# Patient Record
Sex: FEMALE | Race: WHITE | NOT HISPANIC OR LATINO | Employment: OTHER | URBAN - METROPOLITAN AREA
[De-identification: names, ages, dates, MRNs, and addresses within clinical notes are randomized per-mention and may not be internally consistent; named-entity substitution may affect disease eponyms.]

---

## 2022-12-30 ENCOUNTER — TRANSFERRED RECORDS (OUTPATIENT)
Dept: HEALTH INFORMATION MANAGEMENT | Facility: CLINIC | Age: 72
End: 2022-12-30

## 2023-01-13 ENCOUNTER — MEDICAL CORRESPONDENCE (OUTPATIENT)
Dept: HEALTH INFORMATION MANAGEMENT | Facility: HOSPITAL | Age: 73
End: 2023-01-13

## 2023-01-13 ENCOUNTER — TELEPHONE (OUTPATIENT)
Dept: INFUSION THERAPY | Facility: OTHER | Age: 73
End: 2023-01-13

## 2023-01-13 ENCOUNTER — TRANSFERRED RECORDS (OUTPATIENT)
Dept: HEALTH INFORMATION MANAGEMENT | Facility: HOSPITAL | Age: 73
End: 2023-01-13

## 2023-01-13 ENCOUNTER — TELEPHONE (OUTPATIENT)
Dept: FAMILY MEDICINE | Facility: OTHER | Age: 73
End: 2023-01-13
Payer: COMMERCIAL

## 2023-01-13 DIAGNOSIS — Z53.9 ERRONEOUS ENCOUNTER--DISREGARD: Primary | ICD-10-CM

## 2023-01-13 DIAGNOSIS — M81.0 SENILE OSTEOPOROSIS: ICD-10-CM

## 2023-01-13 PROCEDURE — 10000001 PR ERRONEOUS ENCOUNTER--DISREGARD: Performed by: FAMILY MEDICINE

## 2023-01-13 NOTE — NURSING NOTE
Received signed faxed orders from Dr Vazquez with [facility] for Reclast 5mg IV x1 over 15 min. Labs ordered prior, but one of them is a vitamin D level, which can take some time (>1 day) to result. No lab results faxed with patient info/orders from [facility]. Call to their office, they did not do any recent labs on patient. Alerted will reach out to patient to figure out where she wants them done ahead of time. Did also alert Cherelle with Dr Vazquez that we could order the follow up labs to be done in our clinic lab, if patient chooses, but the follow up on the results would be solely the responsibility of the provider and patient, as once her infusion is done, we would not be following any labs. Cherelle verbalizes understanding.     Call to hospital lab, spoke with Cassie, who noted would need at least 2 days after draw to ensure Vitamin D resulted.     Call to patient and asked where she would like her labs done. She prefers to have them done here at our clinic. Explained that once the orders are processed, our  will call her with a clinic lab appointment and then 3 to 7 days later, an infusion appt. Verbalizes understanding.     Therapy plan placed. Made sure to spell out the need for lab appts before and after infusion appt, in appt scheduling request.     Orders placed for secondary review.

## 2023-01-13 NOTE — Clinical Note
Patient needs to be scheduled for reclast but she needs to also have two clinic labs scheduled.  One about three day before reclast and one about a week after reclast.  Please reach out to patient.  Level 2

## 2023-01-16 NOTE — PROGRESS NOTES
Secondary review of reclast therapy plan complete. Orders accurate and up to date. [place] reclast therapy plan faxed to Dr Vazquez to sign.

## 2023-01-20 ENCOUNTER — TELEPHONE (OUTPATIENT)
Dept: INFUSION THERAPY | Facility: OTHER | Age: 73
End: 2023-01-20

## 2023-01-20 DIAGNOSIS — M81.0 SENILE OSTEOPOROSIS: Primary | ICD-10-CM

## 2023-01-20 RX ORDER — HEPARIN SODIUM (PORCINE) LOCK FLUSH IV SOLN 100 UNIT/ML 100 UNIT/ML
5 SOLUTION INTRAVENOUS
Status: CANCELLED | OUTPATIENT
Start: 2023-01-20

## 2023-01-20 RX ORDER — HEPARIN SODIUM,PORCINE 10 UNIT/ML
5 VIAL (ML) INTRAVENOUS
Status: CANCELLED | OUTPATIENT
Start: 2023-01-20

## 2023-01-20 RX ORDER — ZOLEDRONIC ACID 5 MG/100ML
5 INJECTION, SOLUTION INTRAVENOUS ONCE
Status: CANCELLED
Start: 2023-01-20

## 2023-01-20 RX ORDER — DIPHENHYDRAMINE HYDROCHLORIDE 50 MG/ML
50 INJECTION INTRAMUSCULAR; INTRAVENOUS
Status: CANCELLED
Start: 2023-01-20

## 2023-01-20 RX ORDER — ALBUTEROL SULFATE 90 UG/1
1-2 AEROSOL, METERED RESPIRATORY (INHALATION)
Status: CANCELLED
Start: 2023-01-20

## 2023-01-20 RX ORDER — MEPERIDINE HYDROCHLORIDE 25 MG/ML
25 INJECTION INTRAMUSCULAR; INTRAVENOUS; SUBCUTANEOUS EVERY 30 MIN PRN
Status: CANCELLED | OUTPATIENT
Start: 2023-01-20

## 2023-01-20 RX ORDER — ACETAMINOPHEN 325 MG/1
975 TABLET ORAL ONCE
Status: CANCELLED | OUTPATIENT
Start: 2023-01-20

## 2023-01-20 RX ORDER — EPINEPHRINE 1 MG/ML
0.3 INJECTION, SOLUTION, CONCENTRATE INTRAVENOUS EVERY 5 MIN PRN
Status: CANCELLED | OUTPATIENT
Start: 2023-01-20

## 2023-01-20 RX ORDER — ALBUTEROL SULFATE 0.83 MG/ML
2.5 SOLUTION RESPIRATORY (INHALATION)
Status: CANCELLED | OUTPATIENT
Start: 2023-01-20

## 2023-01-20 RX ORDER — METHYLPREDNISOLONE SODIUM SUCCINATE 125 MG/2ML
125 INJECTION, POWDER, LYOPHILIZED, FOR SOLUTION INTRAMUSCULAR; INTRAVENOUS
Status: CANCELLED
Start: 2023-01-20

## 2023-01-20 NOTE — NURSING NOTE
Singed orders received from Dr. Vazquez confirming the okay to use our medical emergency set.  Plan sent to Dr. Laws for signature as patient does not use [employer] system.

## 2023-01-20 NOTE — PROGRESS NOTES
I got the notes from the last 2 visits the patient had with Dr. Vazquez (05/25/22 and 12/30/22). Is this what you are looking for or do you need more? Please advise. Thank you.

## 2023-02-07 ENCOUNTER — TELEPHONE (OUTPATIENT)
Dept: INFUSION THERAPY | Facility: OTHER | Age: 73
End: 2023-02-07

## 2023-02-07 NOTE — TELEPHONE ENCOUNTER
Per Mariluz in  Infusion call pt to schedule, pt denied needing/wanting to schedule. Not scheduling at this time per patient.

## 2023-06-12 ENCOUNTER — TRANSFERRED RECORDS (OUTPATIENT)
Dept: HEALTH INFORMATION MANAGEMENT | Facility: CLINIC | Age: 73
End: 2023-06-12

## 2023-06-12 LAB
ALT SERPL-CCNC: 9 U/L (ref 18–65)
AST SERPL-CCNC: 16 U/L (ref 10–30)
CREATININE (EXTERNAL): 0.74 MG/DL (ref 0.7–1.2)
GFR ESTIMATED (EXTERNAL): 85 ML/MIN/1.73M2
GLUCOSE (EXTERNAL): 142 MG/DL (ref 60–99)
HEP C HIM: NORMAL
POTASSIUM (EXTERNAL): 4.4 MMOL/L (ref 3.5–5.1)

## 2023-06-16 ENCOUNTER — TELEPHONE (OUTPATIENT)
Dept: INFUSION THERAPY | Facility: OTHER | Age: 73
End: 2023-06-16

## 2023-06-16 DIAGNOSIS — M06.9 CHRONIC RHEUMATIC ARTHRITIS (H): ICD-10-CM

## 2023-06-16 NOTE — Clinical Note
Sai Alvarado! Ok to get patient on the schedule for a level 3, labs, and simponi with monitoring period. Thanks you.

## 2023-06-16 NOTE — PROGRESS NOTES
Signed orders received from Dr Vazquez for patient to receive simponi aria 150 mg every 8 weeks. With labs prior to first infusion and every 2nd infusion.  simponi aria therapy plan placed. Labs placed in open orders.      Call placed to [facility] regarding labs needed prior to treatment Hep B and TB, per [facility] if patient has had these labs recently with fax to chemo/infusion.

## 2023-06-16 NOTE — NURSING NOTE
Secondary review of orders complete. Labs received from [facility], dated 6-12-23, noting negative hep b and tb tests, as well as other labs required prior to infusion. WBC are outside of parameters however. Call out to Dr Vazquez's office asking if we are to proceed with infusion/these labs despite elevated WBC or if we are to redraw patient on date of infusion.

## 2023-06-19 ENCOUNTER — TRANSFERRED RECORDS (OUTPATIENT)
Dept: HEALTH INFORMATION MANAGEMENT | Facility: HOSPITAL | Age: 73
End: 2023-06-19

## 2023-06-19 NOTE — PROGRESS NOTES
TC from [facility] Dr. Vazquez nurse per Dr. Taylor sheriff to proceed with infusion with elevated WBC.  New labs do not have to be drawn for this infusion.

## 2023-06-22 NOTE — PROGRESS NOTES
Received orders back signed from Dr Vazquez, no changes noted. Plan sent to Dr Laws, requesting co-sign. Faxed her the H/P we received on patient from [facility].

## 2023-06-23 DIAGNOSIS — M05.9 RHEUMATOID ARTHRITIS WITH POSITIVE RHEUMATOID FACTOR, INVOLVING UNSPECIFIED SITE (H): Primary | ICD-10-CM

## 2023-06-23 RX ORDER — HEPARIN SODIUM (PORCINE) LOCK FLUSH IV SOLN 100 UNIT/ML 100 UNIT/ML
5 SOLUTION INTRAVENOUS
Status: CANCELLED | OUTPATIENT
Start: 2023-06-23

## 2023-06-23 RX ORDER — EPINEPHRINE 1 MG/ML
0.3 INJECTION, SOLUTION INTRAMUSCULAR; SUBCUTANEOUS EVERY 5 MIN PRN
Status: CANCELLED | OUTPATIENT
Start: 2023-06-23

## 2023-06-23 RX ORDER — METHYLPREDNISOLONE SODIUM SUCCINATE 125 MG/2ML
125 INJECTION, POWDER, LYOPHILIZED, FOR SOLUTION INTRAMUSCULAR; INTRAVENOUS
Status: CANCELLED
Start: 2023-06-23

## 2023-06-23 RX ORDER — DIPHENHYDRAMINE HYDROCHLORIDE 50 MG/ML
50 INJECTION INTRAMUSCULAR; INTRAVENOUS
Status: CANCELLED
Start: 2023-06-23

## 2023-06-23 RX ORDER — HEPARIN SODIUM,PORCINE 10 UNIT/ML
5-20 VIAL (ML) INTRAVENOUS DAILY PRN
Status: CANCELLED | OUTPATIENT
Start: 2023-06-23

## 2023-06-23 RX ORDER — ALBUTEROL SULFATE 90 UG/1
1-2 AEROSOL, METERED RESPIRATORY (INHALATION)
Status: CANCELLED
Start: 2023-06-23

## 2023-06-23 RX ORDER — MEPERIDINE HYDROCHLORIDE 25 MG/ML
25 INJECTION INTRAMUSCULAR; INTRAVENOUS; SUBCUTANEOUS EVERY 30 MIN PRN
Status: CANCELLED | OUTPATIENT
Start: 2023-06-23

## 2023-06-23 RX ORDER — ALBUTEROL SULFATE 0.83 MG/ML
2.5 SOLUTION RESPIRATORY (INHALATION)
Status: CANCELLED | OUTPATIENT
Start: 2023-06-23

## 2023-07-10 ENCOUNTER — INFUSION THERAPY VISIT (OUTPATIENT)
Dept: INFUSION THERAPY | Facility: OTHER | Age: 73
End: 2023-07-10
Payer: MEDICARE

## 2023-07-10 VITALS
SYSTOLIC BLOOD PRESSURE: 118 MMHG | DIASTOLIC BLOOD PRESSURE: 64 MMHG | WEIGHT: 142.2 LBS | OXYGEN SATURATION: 98 % | TEMPERATURE: 97.6 F | RESPIRATION RATE: 18 BRPM | HEART RATE: 93 BPM

## 2023-07-10 DIAGNOSIS — M05.9 RHEUMATOID ARTHRITIS WITH POSITIVE RHEUMATOID FACTOR, INVOLVING UNSPECIFIED SITE (H): Primary | ICD-10-CM

## 2023-07-10 PROCEDURE — 250N000011 HC RX IP 250 OP 636: Mod: JZ | Performed by: FAMILY MEDICINE

## 2023-07-10 PROCEDURE — 96365 THER/PROPH/DIAG IV INF INIT: CPT

## 2023-07-10 RX ORDER — HEPARIN SODIUM (PORCINE) LOCK FLUSH IV SOLN 100 UNIT/ML 100 UNIT/ML
5 SOLUTION INTRAVENOUS
Status: CANCELLED | OUTPATIENT
Start: 2023-09-04

## 2023-07-10 RX ORDER — MEPERIDINE HYDROCHLORIDE 25 MG/ML
25 INJECTION INTRAMUSCULAR; INTRAVENOUS; SUBCUTANEOUS EVERY 30 MIN PRN
Status: CANCELLED | OUTPATIENT
Start: 2023-09-04

## 2023-07-10 RX ORDER — DIPHENHYDRAMINE HYDROCHLORIDE 50 MG/ML
50 INJECTION INTRAMUSCULAR; INTRAVENOUS
Status: CANCELLED
Start: 2023-09-04

## 2023-07-10 RX ORDER — METHYLPREDNISOLONE SODIUM SUCCINATE 125 MG/2ML
125 INJECTION, POWDER, LYOPHILIZED, FOR SOLUTION INTRAMUSCULAR; INTRAVENOUS
Status: CANCELLED
Start: 2023-09-04

## 2023-07-10 RX ORDER — ALBUTEROL SULFATE 90 UG/1
1-2 AEROSOL, METERED RESPIRATORY (INHALATION)
Status: CANCELLED
Start: 2023-09-04

## 2023-07-10 RX ORDER — EPINEPHRINE 1 MG/ML
0.3 INJECTION, SOLUTION, CONCENTRATE INTRAVENOUS EVERY 5 MIN PRN
Status: CANCELLED | OUTPATIENT
Start: 2023-09-04

## 2023-07-10 RX ORDER — HEPARIN SODIUM,PORCINE 10 UNIT/ML
5-20 VIAL (ML) INTRAVENOUS DAILY PRN
Status: CANCELLED | OUTPATIENT
Start: 2023-09-04

## 2023-07-10 RX ORDER — ALBUTEROL SULFATE 0.83 MG/ML
2.5 SOLUTION RESPIRATORY (INHALATION)
Status: CANCELLED | OUTPATIENT
Start: 2023-09-04

## 2023-07-10 RX ADMIN — Medication 250 ML: at 13:33

## 2023-07-10 RX ADMIN — GOLIMUMAB 129 MG: 50 SOLUTION INTRAVENOUS at 13:33

## 2023-07-10 NOTE — PROGRESS NOTES
Post Infusion Assessment:  Patient tolerated infusion including 30 minute monitoring period without incident.   *If a hypersensitivity, medication error, or an adverse event occurred, pharmacy was notified in addition to the provider for FDA Reporting.        Discharge Plan:   Copy of AVS reviewed with patient and/or family.

## 2023-07-10 NOTE — PATIENT INSTRUCTIONS

## 2023-07-10 NOTE — PROGRESS NOTES
Infusion Nursing Note:  Angella Ley presents today for infusion of simponi aria.    Patient seen by provider today: No   present during visit today: Not Applicable.    Intravenous Access:Peripheral IV placed.    Treatment Conditions:  Results reviewed, labs MET treatment parameters, ok to proceed with treatment.

## 2023-07-10 NOTE — NURSING NOTE
Due to discrepancy of orders from ordering provider patient received new orders from Dr. Vazquez today 7/10/2023.  Provider note stated 2 mg/kg but order sheet stated a standard dose of 150mg.  Pharmacy reached out to ordering provider and clarification received. New orders placed.

## 2023-07-20 NOTE — PROGRESS NOTES
Pharmacy questioned dosing of drug, conflicting orders of 150 mg dose every 8 weeks, but instructions also say dose ordered of 2 mg/kg.  [facility]- reached out to Dr. Vazquez for corrected orders and patient is to receive 2mg/kg which equals 129 mg. Verbal okay from co signing provider to adjust the dose Pharmacy will change order.  Patient updated

## 2024-03-01 ENCOUNTER — INFUSION THERAPY VISIT (OUTPATIENT)
Dept: INFUSION THERAPY | Facility: OTHER | Age: 74
End: 2024-03-01
Attending: FAMILY MEDICINE
Payer: MEDICARE

## 2024-03-01 VITALS
DIASTOLIC BLOOD PRESSURE: 67 MMHG | RESPIRATION RATE: 16 BRPM | HEART RATE: 105 BPM | WEIGHT: 142.2 LBS | OXYGEN SATURATION: 94 % | SYSTOLIC BLOOD PRESSURE: 135 MMHG | TEMPERATURE: 98.9 F

## 2024-03-01 DIAGNOSIS — M06.9 CHRONIC RHEUMATIC ARTHRITIS (H): ICD-10-CM

## 2024-03-01 DIAGNOSIS — M05.9 RHEUMATOID ARTHRITIS WITH POSITIVE RHEUMATOID FACTOR, INVOLVING UNSPECIFIED SITE (H): Primary | ICD-10-CM

## 2024-03-01 LAB
ALBUMIN SERPL BCG-MCNC: 3.3 G/DL (ref 3.5–5.2)
ALP SERPL-CCNC: 101 U/L (ref 40–150)
ALT SERPL W P-5'-P-CCNC: 10 U/L (ref 0–50)
ANION GAP SERPL CALCULATED.3IONS-SCNC: 11 MMOL/L (ref 7–15)
AST SERPL W P-5'-P-CCNC: 19 U/L (ref 0–45)
BASOPHILS # BLD AUTO: 0 10E3/UL (ref 0–0.2)
BASOPHILS NFR BLD AUTO: 0 %
BILIRUB SERPL-MCNC: 0.3 MG/DL
BUN SERPL-MCNC: 16.9 MG/DL (ref 8–23)
CALCIUM SERPL-MCNC: 8.9 MG/DL (ref 8.8–10.2)
CHLORIDE SERPL-SCNC: 106 MMOL/L (ref 98–107)
CREAT SERPL-MCNC: 0.78 MG/DL (ref 0.51–0.95)
CRP SERPL-MCNC: 26.22 MG/L
DEPRECATED HCO3 PLAS-SCNC: 24 MMOL/L (ref 22–29)
EGFRCR SERPLBLD CKD-EPI 2021: 79 ML/MIN/1.73M2
EOSINOPHIL # BLD AUTO: 0.3 10E3/UL (ref 0–0.7)
EOSINOPHIL NFR BLD AUTO: 2 %
ERYTHROCYTE [DISTWIDTH] IN BLOOD BY AUTOMATED COUNT: 14.3 % (ref 10–15)
ERYTHROCYTE [SEDIMENTATION RATE] IN BLOOD BY WESTERGREN METHOD: 19 MM/HR (ref 0–30)
GLUCOSE SERPL-MCNC: 111 MG/DL (ref 70–99)
HCT VFR BLD AUTO: 45.9 % (ref 35–47)
HGB BLD-MCNC: 14.4 G/DL (ref 11.7–15.7)
IMM GRANULOCYTES # BLD: 0 10E3/UL
IMM GRANULOCYTES NFR BLD: 0 %
LYMPHOCYTES # BLD AUTO: 0.9 10E3/UL (ref 0–5.3)
LYMPHOCYTES NFR BLD AUTO: 8 %
MCH RBC QN AUTO: 27.3 PG (ref 26.5–33)
MCHC RBC AUTO-ENTMCNC: 31.4 G/DL (ref 31.5–36.5)
MCV RBC AUTO: 87 FL (ref 78–100)
MONOCYTES # BLD AUTO: 0.2 10E3/UL (ref 0–1.3)
MONOCYTES NFR BLD AUTO: 2 %
NEUTROPHILS # BLD AUTO: 10 10E3/UL (ref 1.6–8.3)
NEUTROPHILS NFR BLD AUTO: 88 %
NRBC # BLD AUTO: 0 10E3/UL
NRBC BLD AUTO-RTO: 0 /100
PLATELET # BLD AUTO: 330 10E3/UL (ref 150–450)
POTASSIUM SERPL-SCNC: 4.8 MMOL/L (ref 3.4–5.3)
PROT SERPL-MCNC: 6.4 G/DL (ref 6.4–8.3)
RBC # BLD AUTO: 5.28 10E6/UL (ref 3.8–5.2)
SODIUM SERPL-SCNC: 141 MMOL/L (ref 135–145)
WBC # BLD AUTO: 11.5 10E3/UL (ref 4–11)

## 2024-03-01 PROCEDURE — 80053 COMPREHEN METABOLIC PANEL: CPT | Mod: ZL

## 2024-03-01 PROCEDURE — 250N000011 HC RX IP 250 OP 636: Mod: JW | Performed by: FAMILY MEDICINE

## 2024-03-01 PROCEDURE — 36415 COLL VENOUS BLD VENIPUNCTURE: CPT | Mod: ZL

## 2024-03-01 PROCEDURE — 85652 RBC SED RATE AUTOMATED: CPT | Mod: ZL

## 2024-03-01 PROCEDURE — 85025 COMPLETE CBC W/AUTO DIFF WBC: CPT | Mod: ZL

## 2024-03-01 PROCEDURE — 96365 THER/PROPH/DIAG IV INF INIT: CPT

## 2024-03-01 PROCEDURE — 86140 C-REACTIVE PROTEIN: CPT | Mod: ZL

## 2024-03-01 RX ORDER — NAPROXEN SODIUM 220 MG
220 TABLET ORAL
COMMUNITY
Start: 2023-03-12

## 2024-03-01 RX ORDER — PREDNISONE 2.5 MG/1
3 TABLET ORAL DAILY
COMMUNITY
Start: 2023-11-13

## 2024-03-01 RX ORDER — HEPARIN SODIUM,PORCINE 10 UNIT/ML
5-20 VIAL (ML) INTRAVENOUS DAILY PRN
Status: CANCELLED | OUTPATIENT
Start: 2024-03-18

## 2024-03-01 RX ORDER — MEPERIDINE HYDROCHLORIDE 25 MG/ML
25 INJECTION INTRAMUSCULAR; INTRAVENOUS; SUBCUTANEOUS EVERY 30 MIN PRN
Status: CANCELLED | OUTPATIENT
Start: 2024-03-18

## 2024-03-01 RX ORDER — METHYLPREDNISOLONE SODIUM SUCCINATE 125 MG/2ML
125 INJECTION, POWDER, LYOPHILIZED, FOR SOLUTION INTRAMUSCULAR; INTRAVENOUS
Status: CANCELLED
Start: 2024-03-18

## 2024-03-01 RX ORDER — EPINEPHRINE 1 MG/ML
0.3 INJECTION, SOLUTION, CONCENTRATE INTRAVENOUS EVERY 5 MIN PRN
Status: CANCELLED | OUTPATIENT
Start: 2024-03-18

## 2024-03-01 RX ORDER — HEPARIN SODIUM (PORCINE) LOCK FLUSH IV SOLN 100 UNIT/ML 100 UNIT/ML
5 SOLUTION INTRAVENOUS
Status: CANCELLED | OUTPATIENT
Start: 2024-03-18

## 2024-03-01 RX ORDER — DIPHENHYDRAMINE HYDROCHLORIDE 50 MG/ML
50 INJECTION INTRAMUSCULAR; INTRAVENOUS
Status: CANCELLED
Start: 2024-03-18

## 2024-03-01 RX ORDER — ALBUTEROL SULFATE 0.83 MG/ML
2.5 SOLUTION RESPIRATORY (INHALATION)
Status: CANCELLED | OUTPATIENT
Start: 2024-03-18

## 2024-03-01 RX ORDER — ALBUTEROL SULFATE 90 UG/1
1-2 AEROSOL, METERED RESPIRATORY (INHALATION)
Status: CANCELLED
Start: 2024-03-18

## 2024-03-01 RX ADMIN — Medication 250 ML: at 12:47

## 2024-03-01 RX ADMIN — GOLIMUMAB 129 MG: 50 SOLUTION INTRAVENOUS at 12:47

## 2024-03-01 NOTE — PATIENT INSTRUCTIONS

## 2024-03-01 NOTE — PROGRESS NOTES
Infusion Nursing Note:  Angella Ley presents today for Mathew Diaz.    Patient seen by provider today: No   present during visit today: Not Applicable.    Note: Patient notes she did really well after her last infusion, notes no s/e etc.      Treatment Conditions:  Lab Results   Component Value Date    HGB 14.4 03/01/2024    WBC 11.5 (H) 03/01/2024    ANEUTAUTO 10.0 (H) 03/01/2024     03/01/2024        Lab Results   Component Value Date     03/01/2024    POTASSIUM 4.8 03/01/2024    CR 0.78 03/01/2024    ALONA 8.9 03/01/2024    BILITOTAL 0.3 03/01/2024    ALBUMIN 3.3 (L) 03/01/2024    ALT 10 03/01/2024    AST 19 03/01/2024       Results reviewed, labs MET treatment parameters, ok to proceed with treatment.      Post Infusion Assessment:  Patient tolerated infusion without incident.    Delegated discharge to Lorraine OLIVER.      Faxed labs to [facility].

## 2024-03-01 NOTE — PROGRESS NOTES
Discussions with pharmacy yesterday per infusion RN Bonnie re resumption of drug after length of time without and questions re original orders and updated orders for dosing. Call to Ariadna in pharmacy this am, she notes they confirmed through [facility] charting review that patients MD did want her to resume infusions at this time and therapy plan order does now appear to show 2mg/kg as most recently ordered.

## 2024-04-26 ENCOUNTER — INFUSION THERAPY VISIT (OUTPATIENT)
Dept: INFUSION THERAPY | Facility: OTHER | Age: 74
End: 2024-04-26
Attending: FAMILY MEDICINE
Payer: MEDICARE

## 2024-04-26 VITALS
HEART RATE: 66 BPM | HEIGHT: 64 IN | DIASTOLIC BLOOD PRESSURE: 68 MMHG | WEIGHT: 145.2 LBS | BODY MASS INDEX: 24.79 KG/M2 | RESPIRATION RATE: 17 BRPM | SYSTOLIC BLOOD PRESSURE: 105 MMHG

## 2024-04-26 DIAGNOSIS — M05.9 RHEUMATOID ARTHRITIS WITH POSITIVE RHEUMATOID FACTOR, INVOLVING UNSPECIFIED SITE (H): Primary | ICD-10-CM

## 2024-04-26 PROCEDURE — 96365 THER/PROPH/DIAG IV INF INIT: CPT

## 2024-04-26 PROCEDURE — 258N000003 HC RX IP 258 OP 636: Performed by: FAMILY MEDICINE

## 2024-04-26 PROCEDURE — 250N000011 HC RX IP 250 OP 636: Mod: JW | Performed by: FAMILY MEDICINE

## 2024-04-26 RX ORDER — HEPARIN SODIUM (PORCINE) LOCK FLUSH IV SOLN 100 UNIT/ML 100 UNIT/ML
5 SOLUTION INTRAVENOUS
OUTPATIENT
Start: 2024-06-10

## 2024-04-26 RX ORDER — DIPHENHYDRAMINE HYDROCHLORIDE 50 MG/ML
50 INJECTION INTRAMUSCULAR; INTRAVENOUS
Start: 2024-06-10

## 2024-04-26 RX ORDER — ALBUTEROL SULFATE 90 UG/1
1-2 AEROSOL, METERED RESPIRATORY (INHALATION)
Start: 2024-06-10

## 2024-04-26 RX ORDER — ALBUTEROL SULFATE 0.83 MG/ML
2.5 SOLUTION RESPIRATORY (INHALATION)
OUTPATIENT
Start: 2024-06-10

## 2024-04-26 RX ORDER — METHYLPREDNISOLONE SODIUM SUCCINATE 125 MG/2ML
125 INJECTION, POWDER, LYOPHILIZED, FOR SOLUTION INTRAMUSCULAR; INTRAVENOUS
Start: 2024-06-10

## 2024-04-26 RX ORDER — EPINEPHRINE 1 MG/ML
0.3 INJECTION, SOLUTION, CONCENTRATE INTRAVENOUS EVERY 5 MIN PRN
OUTPATIENT
Start: 2024-06-10

## 2024-04-26 RX ORDER — MEPERIDINE HYDROCHLORIDE 25 MG/ML
25 INJECTION INTRAMUSCULAR; INTRAVENOUS; SUBCUTANEOUS EVERY 30 MIN PRN
OUTPATIENT
Start: 2024-06-10

## 2024-04-26 RX ORDER — HEPARIN SODIUM,PORCINE 10 UNIT/ML
5-20 VIAL (ML) INTRAVENOUS DAILY PRN
OUTPATIENT
Start: 2024-06-10

## 2024-04-26 RX ADMIN — SODIUM CHLORIDE 250 ML: 9 INJECTION, SOLUTION INTRAVENOUS at 11:21

## 2024-04-26 RX ADMIN — GOLIMUMAB 132 MG: 50 SOLUTION INTRAVENOUS at 11:22

## 2024-04-26 ASSESSMENT — PAIN SCALES - GENERAL: PAINLEVEL: NO PAIN (1)

## 2024-04-26 NOTE — PROGRESS NOTES
Infusion Nursing Note:  Angella Ley presents today for janicecici josé luiszackary.    Patient seen by provider today: No   present during visit today: Not Applicable.    Note: Patient offers no questions or concerns.  She is accompayined by her niece Kayla.  Patient is a moderate historian with her niece filling in some of the blanks on her assessment.  Patient is pleasant and thanful for today's services.  Patient orders have  with our center and new orders have been requested from her provider.  Patient and niece are concerned about scheduling we are unable to schedule out until new orders are received.  This writer assured patient and niece that we will reach out next week to schedule once we have processed the orders.  .      Intravenous Access:  Peripheral IV placed.    Treatment Conditions:  Lab Results   Component Value Date    HGB 14.4 2024    WBC 11.5 (H) 2024    ANEUTAUTO 10.0 (H) 2024     2024        Lab Results   Component Value Date     2024    POTASSIUM 4.8 2024    CR 0.78 2024    ALONA 8.9 2024    BILITOTAL 0.3 2024    ALBUMIN 3.3 (L) 2024    ALT 10 2024    AST 19 2024       Results reviewed, labs MET treatment parameters, ok to proceed with treatment.      Post Infusion Assessment:  Patient tolerated infusion without incident.  Blood return noted pre and post infusion.  Site patent and intact, free from redness, edema or discomfort.  No evidence of extravasations.  Access discontinued per protocol.       Discharge Plan:   Patient declined prescription refills.  Patient and/or family verbalized understanding of discharge instructions and all questions answered.  AVS to patient via [email or web address].  Patient will return 8 weeks for next appointment.   Patient discharged in stable condition accompanied by: self and Niece.  Departure Mode: Ambulatory.      Bonnie Carranza RN

## 2024-04-26 NOTE — PROGRESS NOTES
New orders requested from  [facility].  They asked that we send her orders as they do not have record of that.  Patient received orders originally from [facility] Dr. Vazquez.  Orders faxed per their request and they will send us updated orders on Monday.  Patient and care giver updated.

## 2024-05-02 DIAGNOSIS — M05.9 RHEUMATOID ARTHRITIS WITH POSITIVE RHEUMATOID FACTOR, INVOLVING UNSPECIFIED SITE (H): Primary | ICD-10-CM

## 2024-05-02 NOTE — PROGRESS NOTES
New orders placed for this patient per the request of Dr. [place] Diaz 2 mg/kg every 8 weeks.  Labs with each infusion.  See labs in open orders.  Placed for secondary review.

## 2024-05-06 DIAGNOSIS — M05.9 RHEUMATOID ARTHRITIS WITH POSITIVE RHEUMATOID FACTOR, INVOLVING UNSPECIFIED SITE (H): Primary | ICD-10-CM

## 2024-05-06 RX ORDER — ALBUTEROL SULFATE 90 UG/1
1-2 AEROSOL, METERED RESPIRATORY (INHALATION)
Status: CANCELLED
Start: 2024-05-09

## 2024-05-06 RX ORDER — HEPARIN SODIUM,PORCINE 10 UNIT/ML
5-20 VIAL (ML) INTRAVENOUS DAILY PRN
Status: CANCELLED | OUTPATIENT
Start: 2024-05-09

## 2024-05-06 RX ORDER — EPINEPHRINE 1 MG/ML
0.3 INJECTION, SOLUTION INTRAMUSCULAR; SUBCUTANEOUS EVERY 5 MIN PRN
Status: CANCELLED | OUTPATIENT
Start: 2024-05-09

## 2024-05-06 RX ORDER — ALBUTEROL SULFATE 0.83 MG/ML
2.5 SOLUTION RESPIRATORY (INHALATION)
Status: CANCELLED | OUTPATIENT
Start: 2024-05-09

## 2024-05-06 RX ORDER — METHYLPREDNISOLONE SODIUM SUCCINATE 125 MG/2ML
125 INJECTION, POWDER, LYOPHILIZED, FOR SOLUTION INTRAMUSCULAR; INTRAVENOUS
Status: CANCELLED
Start: 2024-05-09

## 2024-05-06 RX ORDER — HEPARIN SODIUM (PORCINE) LOCK FLUSH IV SOLN 100 UNIT/ML 100 UNIT/ML
5 SOLUTION INTRAVENOUS
Status: CANCELLED | OUTPATIENT
Start: 2024-05-09

## 2024-05-06 RX ORDER — MEPERIDINE HYDROCHLORIDE 25 MG/ML
25 INJECTION INTRAMUSCULAR; INTRAVENOUS; SUBCUTANEOUS EVERY 30 MIN PRN
Status: CANCELLED | OUTPATIENT
Start: 2024-05-09

## 2024-05-06 RX ORDER — DIPHENHYDRAMINE HYDROCHLORIDE 50 MG/ML
50 INJECTION INTRAMUSCULAR; INTRAVENOUS
Status: CANCELLED
Start: 2024-05-09

## 2024-07-10 ENCOUNTER — INFUSION THERAPY VISIT (OUTPATIENT)
Dept: INFUSION THERAPY | Facility: OTHER | Age: 74
End: 2024-07-10
Attending: FAMILY MEDICINE
Payer: MEDICARE

## 2024-07-10 VITALS
SYSTOLIC BLOOD PRESSURE: 126 MMHG | TEMPERATURE: 97.7 F | HEART RATE: 80 BPM | HEIGHT: 64 IN | DIASTOLIC BLOOD PRESSURE: 75 MMHG | OXYGEN SATURATION: 96 % | BODY MASS INDEX: 24.91 KG/M2 | WEIGHT: 145.9 LBS

## 2024-07-10 DIAGNOSIS — M05.9 RHEUMATOID ARTHRITIS WITH POSITIVE RHEUMATOID FACTOR, INVOLVING UNSPECIFIED SITE (H): Primary | ICD-10-CM

## 2024-07-10 LAB
ALT SERPL W P-5'-P-CCNC: 10 U/L (ref 0–50)
CREAT SERPL-MCNC: 0.71 MG/DL (ref 0.51–0.95)
CRP SERPL-MCNC: 4.29 MG/L
EGFRCR SERPLBLD CKD-EPI 2021: 88 ML/MIN/1.73M2
ERYTHROCYTE [SEDIMENTATION RATE] IN BLOOD BY WESTERGREN METHOD: 9 MM/HR (ref 0–30)
HGB BLD-MCNC: 15.4 G/DL (ref 11.7–15.7)
PLATELET # BLD AUTO: 357 10E3/UL (ref 150–450)
WBC # BLD AUTO: 11.1 10E3/UL (ref 4–11)

## 2024-07-10 PROCEDURE — 86140 C-REACTIVE PROTEIN: CPT | Mod: ZL

## 2024-07-10 PROCEDURE — 96365 THER/PROPH/DIAG IV INF INIT: CPT

## 2024-07-10 PROCEDURE — 85049 AUTOMATED PLATELET COUNT: CPT | Mod: ZL

## 2024-07-10 PROCEDURE — 85018 HEMOGLOBIN: CPT | Mod: ZL

## 2024-07-10 PROCEDURE — 84460 ALANINE AMINO (ALT) (SGPT): CPT | Mod: ZL

## 2024-07-10 PROCEDURE — 85652 RBC SED RATE AUTOMATED: CPT | Mod: ZL

## 2024-07-10 PROCEDURE — 250N000011 HC RX IP 250 OP 636: Mod: JW | Performed by: FAMILY MEDICINE

## 2024-07-10 PROCEDURE — 82565 ASSAY OF CREATININE: CPT | Mod: ZL

## 2024-07-10 PROCEDURE — 85048 AUTOMATED LEUKOCYTE COUNT: CPT | Mod: ZL

## 2024-07-10 PROCEDURE — 36415 COLL VENOUS BLD VENIPUNCTURE: CPT

## 2024-07-10 RX ORDER — EPINEPHRINE 1 MG/ML
0.3 INJECTION, SOLUTION, CONCENTRATE INTRAVENOUS EVERY 5 MIN PRN
Status: CANCELLED | OUTPATIENT
Start: 2024-09-04

## 2024-07-10 RX ORDER — DIPHENHYDRAMINE HYDROCHLORIDE 50 MG/ML
50 INJECTION INTRAMUSCULAR; INTRAVENOUS
Status: CANCELLED
Start: 2024-09-04

## 2024-07-10 RX ORDER — HEPARIN SODIUM (PORCINE) LOCK FLUSH IV SOLN 100 UNIT/ML 100 UNIT/ML
5 SOLUTION INTRAVENOUS
Status: CANCELLED | OUTPATIENT
Start: 2024-09-04

## 2024-07-10 RX ORDER — METHYLPREDNISOLONE SODIUM SUCCINATE 125 MG/2ML
125 INJECTION, POWDER, LYOPHILIZED, FOR SOLUTION INTRAMUSCULAR; INTRAVENOUS
Status: CANCELLED
Start: 2024-09-04

## 2024-07-10 RX ORDER — HEPARIN SODIUM,PORCINE 10 UNIT/ML
5-20 VIAL (ML) INTRAVENOUS DAILY PRN
Status: CANCELLED | OUTPATIENT
Start: 2024-09-04

## 2024-07-10 RX ORDER — ALBUTEROL SULFATE 0.83 MG/ML
2.5 SOLUTION RESPIRATORY (INHALATION)
Status: CANCELLED | OUTPATIENT
Start: 2024-09-04

## 2024-07-10 RX ORDER — MEPERIDINE HYDROCHLORIDE 25 MG/ML
25 INJECTION INTRAMUSCULAR; INTRAVENOUS; SUBCUTANEOUS EVERY 30 MIN PRN
Status: CANCELLED | OUTPATIENT
Start: 2024-09-04

## 2024-07-10 RX ORDER — ALBUTEROL SULFATE 90 UG/1
1-2 AEROSOL, METERED RESPIRATORY (INHALATION)
Status: CANCELLED
Start: 2024-09-04

## 2024-07-10 RX ADMIN — Medication 250 ML: at 14:16

## 2024-07-10 RX ADMIN — GOLIMUMAB 132 MG: 50 SOLUTION INTRAVENOUS at 14:18

## 2024-07-10 ASSESSMENT — PAIN SCALES - GENERAL: PAINLEVEL: MODERATE PAIN (4)

## 2024-07-10 NOTE — PROGRESS NOTES
Infusion Nursing Note:  Angellamarlen Ley presents today for Mathew Diaz.    Patient seen by provider today: No   present during visit today: Not Applicable.    Note: N/A.    Intravenous Access:  Peripheral IV placed.  Labs drawn without difficulty from peripheral IV by Lorraine OLIVER.    Treatment Conditions:  Results reviewed, labs MET treatment parameters, ok to proceed with treatment.    Component      Latest Ref Rng 7/10/2024  12:48 PM   Creatinine      0.51 - 0.95 mg/dL 0.71    GFR Estimate      >60 mL/min/1.73m2 88    Hemoglobin      11.7 - 15.7 g/dL 15.4    WBC      4.0 - 11.0 10e3/uL 11.1 (H)    CRP Inflammation      <5.00 mg/L 4.29    Sed Rate      0 - 30 mm/hr 9    ALT      0 - 50 U/L 10    Platelet Count      150 - 450 10e3/uL 357       Legend:  (H) High    Post Infusion Assessment:  Patient tolerated infusion without incident.  Site patent and intact, free from redness, edema or discomfort.  No evidence of extravasations.  Access discontinued per protocol.     Discharge Plan:   Patient discharged in stable condition accompanied by: family.  Departure Mode: Ambulatory.

## 2024-07-29 NOTE — PROGRESS NOTES
[facility]    RADIATION ONCOLOGY CONSULTATION      Angella Ley  is referred by Dr. Renteria for an oncology consultation.    PRIMARY PHYSICIAN: Kenton Pena     Cancer Staging   No matching staging information was found for the patient.      IMPRESSION:Angella unfortunately has locally invasive Stage IB IDC.  ^/8 LN + ER, ID + and Her2 -. Dermal,lymphatic, perineural and skeletal muscle in the pectoralis all present. Healing up well at this point. About 4 weeks out from surgery. Her grand niece is present. Her niece is POA but unavailable today.    PLAN: She is at very high risk of local and regional/dorcas recurrence.  Would recommend radiation to the chest wall and all regional nodes including the IMC nodes to a dose of 50 Gy in 25 fractions     I had the chance to review the course, rationale, risks, benefits , side effects and complications with Angella and her grand niece. I believe they have a good understanding of the issues and have had their questions asked and answered to their satisfaction. Will set her up for simulation and treatment planning.    Orders Placed This Encounter   Procedures    Physical Therapy  Referral      ______________________________________________________________________  HISTORY OF PRESENT ILLNESS: Angella is a 74-year-old female patient who presents for radiation therapy status post left breast mastectomy due to recent diagnosis of multifocal invasive ductal carcinoma, Duke grade 2.  Tumor extensively invaded the dermis, with focal involvement of the epidermis.  Tumor extends to the deep margin focally involves skeletal muscle.  Extensive perineural invasion identified.  ER/ID positive, HER2 negative.  6 of 8 left axillary lymph nodes positive for metastatic carcinoma.  Her Oncotype recurrence score resulted as 12, therefore it is not felt there is benefit to receive chemotherapy.  Patient presents to discuss radiation therapy, with plan of starting on  Arimidex possibly abemaciclib after completion of RT. History as below     4/3/2024 left breast mammogram with ultrasound: Within the left breast at the 5 to 7 o'clock position about 2 cm from the nipple there is an area of concern with irregular shadowing throughout.  There is axillary adenopathy that measures 3 x 1.7 x 2.5 cm    4/11/2024 left breast core biopsy: Low-grade invasive ductal carcinoma.  Left axilla core biopsy: Metastatic invasive ductal carcinoma.  OH positive, HER2 negative    6/19/2024 left breast mastectomy: Multifocal invasive ductal carcinoma, Duke grade 2, foci measuring 45 mm and 9 mm.  Tumor extensively involves the dermis, with focal involvement of the epidermis.  Tumor extends to the deep margin, focally involves skeletal muscle.  Extensive perineural invasion identified.  Minor component of intermediate grade ductal carcinoma in situ.  Atypical lobular hyperplasia.  ER/OH positive, HER2 negative.  6 of 8 left axillary lymph nodes with macrometastases.  Extranodal extension present pathologically staged pT2 pN2a.  Oncotype recurrence score 12    7/24/2024 medical oncology visit ( [facility]): Oncotype recurrence score of 12, no chemotherapy indicated.  Planning for Arimidex 1 mg daily, may add in abemaciclib after completion of radiation therapy.    Scheduling Conflicts: Requires rides, has grand niece (PCA), daughter (POA) who help her out   Systemic Therapy: AI and receives Golimumab for RA)  Pacemaker: denies   Hx of autoimmune disorders: Rheumatoid arthritis (Golimumab infusions q. 58 days)  Previous Radiation Therapy: Patient unsure      Depression Screening Follow Up        Follow Up Actions Taken  Patient counseled, no additional follow up at this time.               Past Medical History:   Diagnosis Date    Actinic keratosis 03/10/2014    Acute deep vein thrombosis (DVT) of calf muscle vein of right lower extremity (H) 05/30/2024    Last Assessment & Plan:    Formatting of  this note might be different from the original.   Resume eliquis today.      Anemia due to acute blood loss 03/10/2023    Last Assessment & Plan:    Formatting of this note might be different from the original.   - Serial hemoglobins   -Anemia is likely due to GI bleed   -Transfuse for hemoglobin less than 7 or symptoms      Atypical ductal hyperplasia of breast     S/p bx    Breast cancer, stage 2, left (H) 05/30/2024    Last Assessment & Plan:    Formatting of this note might be different from the original.   -POD#! S/P left mastectomy with SLNB   - DAAM to  bulb suction, record output, patient education   -compression wrap   -multimodal pain control   -bowel regimen   -heparin VTE prophylaxis   -resume eliquis today      Chondromalacia of patella 11/16/2006    Chronic rheumatic arthritis (H) 06/16/2023    Deep vein thrombosis (DVT) of left lower extremity (H) 2014 Sept 2014- finished taking coumadin April 2015    Depressive disorder 1999    Derangement of lateral meniscus, unspecified laterality 11/16/2006    Diverticulosis of colon without hemorrhage 06/14/2005    DJD (degenerative joint disease) of knee 03/15/2013    Left/and right - 12/6/2013    Edema 07/22/2003    in both ankles    Fall at home 02/04/2024    Gastrointestinal hemorrhage with melena 03/10/2023    History of basal cell carcinoma     Right cheek    History of dysplastic nevus 03/08/2017    Formatting of this note might be different from the original.   Moderate atypia right upper back 2016      Hypercholesterolemia 12/06/2013    Hyperlipidemia 10/25/2007    Formatting of this note might be different from the original.   IMO Update 10/11      Impaired mobility 02/04/2024    Inflammatory polyarthropathy or polyarthritis (H) 12/12/2003    Invasive ductal carcinoma of breast, female, left (H) 05/28/2024    Long-term use of immunosuppressant medication 05/06/2016    Memory loss 1999    Migraine headache 07/22/2003    Moderate Alzheimer's dementia (H)  02/16/2024    Last Assessment & Plan:    Formatting of this note might be different from the original.   Has family member staying with her at home.      DEYANIRA on CPAP 03/15/2016    Osteopenia 05/02/2022    Osteoporosis 10/31/2016    Pain in joint, lower leg 07/22/2003    left medial knee.    Personal history of other malignant neoplasm of skin 03/10/2014    Formatting of this note might be different from the original.   SCC left medial cheek 2014      Rheumatoid arthritis (H) 04/29/2015    Last Assessment & Plan:    Formatting of this note might be different from the original.   Home dose steroid      S/P total knee arthroplasty 03/25/2013    Formatting of this note might be different from the original.   bilaterl TKA's      Senile osteoporosis 01/13/2023    Sleep apnea     Strain of ankle, right 02/16/2024    Subconjunctival hemorrhage of right eye 02/04/2024    Synovial cyst of popliteal space ([place]), left knee 02/22/2013    Transient arthropathy, pelvic region and thigh 05/28/2002    Varicose veins of other specified sites 07/22/2003    left saphenous system.    Vitamin D deficiency        Past Surgical History:   Procedure Laterality Date    BIOPSY OF BREAST, NEEDLE CORE Right 10/25/2004    Atipical ductal hyperplasia    BIOPSY OF BREAST, NEEDLE CORE Right 12/17/2004    Atipical ductal hyperplasia    COLONOSCOPY  10/04/2017    Procedure: COLONOSCOPY DIAGNOSTIC with biopsy;  Surgeon: Sean Sheehan MD;  [facility]    COLONOSCOPY  03/11/2023    Procedure: COLONOSCOPY DIAGNOSTIC;  Surgeon: Manan Neil MD;  [facility]    COLONOSCOPY  08/07/2014    Dr Sheehan at [facility]-tubular adenoma    COLONOSCOPY,FLEX,DIAGNOSTIC  06/14/2005    EXC SKIN BENIG <5MM FACE,FACIAL  07/22/2003    Removal Benign Lesion, face, ears, eyelids, nose, lips, mucous membrane    KNEE SCOPE,SINGLE MENISCECTOMY  11/16/2006    LASER SURGERY OF EYE  05/13/2002    Laser, eye    MASTECTOMY Left 06/19/2024    Procedure: MASTECTOMY  WITH BIOPSY NODE SENTINEL, LEFT;  Surgeon: Sally Wolfe DO;  Location: -[facility] OR    REPLACEMENT TOTAL KNEE Right 12/03/2013    STEREOTACTIC BREAST BIOPSY - RADIOLOGY      THYROIDECTOMY   07/22/2003    partial, 09/97 for degenerative colloid cyst.    TOTAL KNEE ARTHROPLASTY Left 03/12/2013    UPPER GASTROINTESTINAL ENDOSCOPY  03/11/2023    Procedure: ESOPHAGOGASTRODUODENOSCOPY DIAGNOSTIC colonoscopy diagnostic with polypectomies;  Surgeon: Manan Neil MD;  Location: -[facility] OR    Wire localized biopsy right breast x 2  12/17/2004       Family History   Problem Relation Age of Onset    Skin Cancer Mother        Social History     Tobacco Use    Smoking status: Never    Smokeless tobacco: Never   Substance Use Topics    Alcohol use: Not Currently         CURRENT MEDICATIONS:   Current Outpatient Medications   Medication Sig Dispense Refill    anastrozole (ARIMIDEX) 1 MG tablet Take 1 tablet by mouth daily      apixaban ANTICOAGULANT (ELIQUIS) 5 MG tablet Take 5 mg by mouth 2 times daily      cholecalciferol (VITAMIN D3) 125 mcg (5000 units) capsule Take 5,000 Units by mouth      MULTIPLE VITAMIN PO Take 1 tablet by mouth daily      predniSONE (DELTASONE) 2.5 MG tablet Take 3 tablets by mouth daily      naproxen sodium (ANAPROX) 220 MG tablet Take 220 mg by mouth (Patient not taking: Reported on 7/31/2024)       No current facility-administered medications for this visit.         ALLERGIES:  Allergies   Allergen Reactions    Latex Unknown and Other (See Comments)     Created open sore that took months to heal per pt.    Alendronate Other (See Comments)     Jaw pain    Celecoxib Other (See Comments)     Mental fog    Methotrexate GI Disturbance     GI side effects    Sulfa Antibiotics            Review of Systems   Constitutional:  Negative for chills, fever, malaise/fatigue and weight loss.   Respiratory:  Negative for cough and shortness of breath.    Cardiovascular:  Negative for chest pain.  "  Gastrointestinal:  Negative for abdominal pain, constipation, diarrhea, nausea and vomiting.   Psychiatric/Behavioral:  The patient does not have insomnia.      VITAL SIGNS:  /78 (BP Location: Right arm, Patient Position: Chair, Cuff Size: Adult Regular)   Pulse 86   Temp 98.5  F (36.9  C) (Tympanic)   Resp 20   Ht 1.626 m (5' 4\")   Wt 68.4 kg (150 lb 11.2 oz)   SpO2 98%   BMI 25.87 kg/m    Wt Readings from Last 4 Encounters:   07/31/24 68.4 kg (150 lb 11.2 oz)   07/10/24 66.2 kg (145 lb 14.4 oz)   04/26/24 65.9 kg (145 lb 3.2 oz)   03/01/24 64.5 kg (142 lb 3.2 oz)       PHYSICAL EXAM:  Constitutional: awake, alert, cooperative, no apparent distress, and appears stated age  Eyes: Lids and lashes normal, extra ocular muscles intact, sclera clear, conjunctiva normal  ENT: normocephalic, without obvious abnormality  Hematologic / Lymphatic: no cervical lymphadenopathy and no supraclavicular lymphadenopathy  Respiratory: No increased work of breathing, good air exchange, clear to auscultation bilaterally, no crackles or wheezing  Cardiovascular: normal S1 and S2  GI: Normal bowel sounds, soft, non-distended, non-tender  Breast: Left breast mastectomy, incision well healed, no s/s of infection   Skin: no jaundice  Musculoskeletal: tone is normal  Neurologic: Short term memory is poor, is able to follow conversation and answer questions appropriately   Neuropsychiatric: General: normal, calm, and normal eye contact    Physician attestation:  I saw and evaluated patient as part of a shared APRN visit. I personally reviewed her chart and pathology/laboratory data.. Key management decisions made by me and carried out under my direction include: need for treatment, course of treatment in terms of dose and fractionation and volumes.     ANDREA spent 25 minutes with the patient and 5 minutes on chart preparation and review, for a total of 30 minutes   Physician spent 20 minutes with the patient and 30 minutes on " chart, image, literature review and documentation review, for a total of 50 minutes.     Total billable time: 80 minutes on this date of service           VA Lawrence CNP
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
## 2024-07-30 PROBLEM — M85.80 OSTEOPENIA: Status: ACTIVE | Noted: 2022-05-02
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
## 2024-07-30 PROBLEM — D62 ANEMIA DUE TO ACUTE BLOOD LOSS: Status: ACTIVE | Noted: 2023-03-10

## 2024-07-30 PROBLEM — I82.461 ACUTE DEEP VEIN THROMBOSIS (DVT) OF CALF MUSCLE VEIN OF RIGHT LOWER EXTREMITY (H): Status: ACTIVE | Noted: 2024-05-30

## 2024-07-30 PROBLEM — H11.31 SUBCONJUNCTIVAL HEMORRHAGE OF RIGHT EYE: Status: ACTIVE | Noted: 2024-02-04

## 2024-07-30 PROBLEM — C50.912: Status: ACTIVE | Noted: 2024-05-30

## 2024-07-30 PROBLEM — Z86.018 HISTORY OF DYSPLASTIC NEVUS: Status: ACTIVE | Noted: 2017-03-08

## 2024-07-30 PROBLEM — Z74.09 IMPAIRED MOBILITY: Status: ACTIVE | Noted: 2024-02-04

## 2024-07-30 PROBLEM — S96.911A STRAIN OF ANKLE, RIGHT: Status: ACTIVE | Noted: 2024-02-16

## 2024-07-30 PROBLEM — C50.912 INVASIVE DUCTAL CARCINOMA OF BREAST, FEMALE, LEFT (H): Status: ACTIVE | Noted: 2024-05-28

## 2024-07-30 PROBLEM — G30.9: Status: ACTIVE | Noted: 2024-02-16

## 2024-07-30 PROBLEM — F02.B0: Status: ACTIVE | Noted: 2024-02-16

## 2024-07-31 ENCOUNTER — ONCOLOGY VISIT (OUTPATIENT)
Dept: RADIATION ONCOLOGY | Facility: HOSPITAL | Age: 74
End: 2024-07-31
Attending: RADIOLOGY
Payer: COMMERCIAL

## 2024-07-31 VITALS
DIASTOLIC BLOOD PRESSURE: 78 MMHG | SYSTOLIC BLOOD PRESSURE: 116 MMHG | HEIGHT: 64 IN | RESPIRATION RATE: 20 BRPM | OXYGEN SATURATION: 98 % | TEMPERATURE: 98.5 F | HEART RATE: 86 BPM | BODY MASS INDEX: 25.73 KG/M2 | WEIGHT: 150.7 LBS

## 2024-07-31 DIAGNOSIS — C50.912 BREAST CANCER, STAGE 2, LEFT (H): Primary | ICD-10-CM

## 2024-07-31 PROCEDURE — 99417 PROLNG OP E/M EACH 15 MIN: CPT | Performed by: RADIOLOGY

## 2024-07-31 PROCEDURE — G0463 HOSPITAL OUTPT CLINIC VISIT: HCPCS

## 2024-07-31 PROCEDURE — 99205 OFFICE O/P NEW HI 60 MIN: CPT | Mod: FS | Performed by: RADIOLOGY

## 2024-07-31 RX ORDER — ANASTROZOLE 1 MG/1
1 TABLET ORAL DAILY
COMMUNITY
Start: 2024-07-24

## 2024-07-31 ASSESSMENT — ENCOUNTER SYMPTOMS
DIARRHEA: 0
VOMITING: 0
NAUSEA: 0
ABDOMINAL PAIN: 0
SHORTNESS OF BREATH: 0
INSOMNIA: 0
WEIGHT LOSS: 0
CHILLS: 0
COUGH: 0
CONSTIPATION: 0
FEVER: 0

## 2024-07-31 ASSESSMENT — PATIENT HEALTH QUESTIONNAIRE - PHQ9: SUM OF ALL RESPONSES TO PHQ QUESTIONS 1-9: 2

## 2024-07-31 ASSESSMENT — PAIN SCALES - GENERAL: PAINLEVEL: NO PAIN (0)

## 2024-07-31 NOTE — PROGRESS NOTES
"Radiation Oncology Rooming Note    July 31, 2024 10:50 AM     Angella Ley is a 74 year old female who presents for:       Chief Complaint   Patient presents with    Consult     Radiation Oncology Consultation      Patient presents today with her great niece in company. Patient no longer drives and relies on family for rides. Niece, Rashmi, has power of  and they will bring the paperwork for this to the next appointment on August 7th, 2024. Sally Adrian CNP, Dr. Bobby Lai, and  made aware.       Initial Vitals: /78 (BP Location: Right arm, Patient Position: Chair, Cuff Size: Adult Regular)   Pulse 86   Temp 98.5  F (36.9  C) (Tympanic)   Resp 20   Ht 1.626 m (5' 4\")   Wt 68.4 kg (150 lb 11.2 oz)   SpO2 98%   BMI 25.87 kg/m     Estimated body mass index is 25.87 kg/m  as calculated from the following:    Height as of this encounter: 1.626 m (5' 4\").    Weight as of this encounter: 68.4 kg (150 lb 11.2 oz).   Body surface area is 1.76 meters squared.  No Pain (0) Comment: Data Unavailable       Allergies reviewed: Yes  Medications reviewed: Yes      Patient completed the following questionnaires: PHQ-9 and NCCN Distress Thermometer.       Patient was assessed using the NCCN psychosocial distress thermometer. Patient rated the score as a one. Patient rated current stressors as memory/concentration, loneliness, and taking care of myself. Stressors brought to the attention of Sally Adrian CNP and  for a score of 6 or greater or per nurses discretion.       Patient received folder containing the following:  radiation therapy timeline  financial letter  vaccines during cancer treatment hand out  mental health services and providers packet  cancer resource list  cancer rehab information handout  family support group handouts  radiation therapy business card    Patient and great niece report that Rashmi(Casandra) has power of . Requested they bring this paperwork " "in at the next appointment.       Financial assistance resources given to patient:  [employer] application  [employer] application   [employer] application      Nutrition Assessment    Height: 5' 4\"   Weight: 150 lbs 11.2 oz    Usual Weight: 150lbs   Weight Change: 0      Past Medical History:   Diagnosis Date    Actinic keratosis 03/10/2014    Acute deep vein thrombosis (DVT) of calf muscle vein of right lower extremity (H) 05/30/2024    Last Assessment & Plan:    Formatting of this note might be different from the original.   Resume eliquis today.      Anemia due to acute blood loss 03/10/2023    Last Assessment & Plan:    Formatting of this note might be different from the original.   - Serial hemoglobins   -Anemia is likely due to GI bleed   -Transfuse for hemoglobin less than 7 or symptoms      Atypical ductal hyperplasia of breast     S/p bx    Breast cancer, stage 2, left (H) 05/30/2024    Last Assessment & Plan:    Formatting of this note might be different from the original.   -POD#! S/P left mastectomy with SLNB   - ADAM to  bulb suction, record output, patient education   -compression wrap   -multimodal pain control   -bowel regimen   -heparin VTE prophylaxis   -resume eliquis today      Chondromalacia of patella 11/16/2006    Chronic rheumatic arthritis (H) 06/16/2023    Deep vein thrombosis (DVT) of left lower extremity (H) 2014 Sept 2014- finished taking coumadin April 2015    Depressive disorder 1999    Derangement of lateral meniscus, unspecified laterality 11/16/2006    Diverticulosis of colon without hemorrhage 06/14/2005    DJD (degenerative joint disease) of knee 03/15/2013    Left/and right - 12/6/2013    Edema 07/22/2003    in both ankles    Fall at home 02/04/2024    Gastrointestinal hemorrhage with melena 03/10/2023    History of basal cell carcinoma     Right cheek    History of dysplastic nevus 03/08/2017    Formatting of this note might be different from the original.   Moderate " atypia right upper back 2016      Hypercholesterolemia 12/06/2013    Hyperlipidemia 10/25/2007    Formatting of this note might be different from the original.   IMO Update 10/11      Impaired mobility 02/04/2024    Inflammatory polyarthropathy or polyarthritis (H) 12/12/2003    Invasive ductal carcinoma of breast, female, left (H) 05/28/2024    Long-term use of immunosuppressant medication 05/06/2016    Memory loss 1999    Migraine headache 07/22/2003    Moderate Alzheimer's dementia (H) 02/16/2024    Last Assessment & Plan:    Formatting of this note might be different from the original.   Has family member staying with her at home.      DEYANIRA on CPAP 03/15/2016    Osteopenia 05/02/2022    Osteoporosis 10/31/2016    Pain in joint, lower leg 07/22/2003    left medial knee.    Personal history of other malignant neoplasm of skin 03/10/2014    Formatting of this note might be different from the original.   SCC left medial cheek 2014      Rheumatoid arthritis (H) 04/29/2015    Last Assessment & Plan:    Formatting of this note might be different from the original.   Home dose steroid      S/P total knee arthroplasty 03/25/2013    Formatting of this note might be different from the original.   bilaterl TKA's      Senile osteoporosis 01/13/2023    Sleep apnea     Strain of ankle, right 02/16/2024    Subconjunctival hemorrhage of right eye 02/04/2024    Synovial cyst of popliteal space (Baker), left knee 02/22/2013    Transient arthropathy, pelvic region and thigh 05/28/2002    Varicose veins of other specified sites 07/22/2003    left saphenous system.    Vitamin D deficiency        1. Receiving Chemotherapy? No - 0 points  2. Weight Loss per Month (in pounds) Based on Usual Weight: 0    100# 100-120# 120-150# 150-200# greater than 200# Pt. System   greater than 5 5 - 6 6 - 8 7 - 10 greater than 10 1 Point   greater than 7 7- 9 9 - 11 11 - 14 greater than 15 2 Points   greater than 10 10 - 12 12 - 15 15 - 20 greater than  20 3 Points       3. Eating Problems: ( 1 Point for Each Problem)       Loss of Appetite     No - 0 points    Difficulty Swallowing    No - 0 points   Nausea    No - 0 points    Early Satiety    No - 0 points   Vomiting    No - 0 points    Taste/Smell Aversions  No - 0 points    Diarrhea    No - 0 points -occasional but reports this is related to what she eats   Esophageal Reflux   No - 0 points   Mouth Soreness/Difficulty Chewing  No - 0 points          Total: 0    4.  Automatic Referral for the Following Diagnosis or Situations:  declines at this time       Total Score: 0 (Scores greater than or equal to 4 will receive a Dietician Consult)        Julissa Jules LPN     Normal for race

## 2024-08-07 ENCOUNTER — CARE COORDINATION (OUTPATIENT)
Dept: BEHAVIORAL HEALTH | Facility: OTHER | Age: 74
End: 2024-08-07

## 2024-08-07 ENCOUNTER — ALLIED HEALTH/NURSE VISIT (OUTPATIENT)
Dept: RADIATION ONCOLOGY | Facility: HOSPITAL | Age: 74
End: 2024-08-07
Attending: RADIOLOGY
Payer: MEDICARE

## 2024-08-07 DIAGNOSIS — C50.912 BREAST CANCER, STAGE 2, LEFT (H): Primary | ICD-10-CM

## 2024-08-07 PROCEDURE — 77290 THER RAD SIMULAJ FIELD CPLX: CPT | Performed by: RADIOLOGY

## 2024-08-07 PROCEDURE — 77290 THER RAD SIMULAJ FIELD CPLX: CPT | Mod: 26 | Performed by: RADIOLOGY

## 2024-08-07 NOTE — PROGRESS NOTES
Patient was simulated today.    Eliu KING.)  Department of Radiation Oncology  Phone: [phone]

## 2024-08-07 NOTE — PROGRESS NOTES
[facility]  DEPARTMENT OF RADIATION ONCOLOGY   SIMULATION NOTE    Name: Angella Ley MRN: [id number]   : 1950 (74 year old)  Date of Service: Aug 7, 2024        Diagnosis and Cancer Staging   No matching staging information was found for the patient.       Procedure   For non-SBRT treatment, the patient comes to clinic for simulation and radiation therapy for treatment as specified on the written consent (site of treatment, type of cancer). Therapy, Treatment Planning, and I reviewed the anticipated radiation therapy, clinical history and documentation, and radiographic information and images. We obtained written consent for treatment. With the patient, we verified their identification, site, and side of treatment. We evaluated multiple setup positions and elected to simulate the patient in a modified supine position. We used orthogonal lasers to align them with the CT simulator. We immobilized the patient with a customized torso mold and accessories to improve the reproducibility and safety for daily radiation therapy. We placed radiopaque markers to assist in identifying topographical landmarks for simulation. We obtained  and axial CT imaging through the target region. We used virtual simulation techniques to verify the adequacy of the CT images and to create a preliminary setup isocenter. Motion management of respiratory and cardiac motion utilized 4D-CT and/or deep inspiration breath hold (DIBH) through the anticipated field of radiation therapy. We placed tattoos to benji the setup isocenter. The patient tolerated the procedure well and without complications. We will use available diagnostic and radiation therapy imaging studies for CT-based treatment planning with image fusion as indicated. I anticipate utilizing a form of intensity-modulated or 3D-conformal radiation therapy to develop a computerized treatment plan whose dosimetric analysis (e.g., dose-volume histogram  (DVH)) indicates adequate coverage of target tissues and sparing of nearby normal structures. We will complete routine QA procedures. The patient wished to proceed as recommended. We answered all questions to her satisfaction.    Implanted Cardiac Devices: None.      VA Lawrence CNP   Department of Radiation Oncology  Tel: [phone]  Fax: [phone]

## 2024-08-07 NOTE — PROGRESS NOTES
"Care Coordination Focused Note:    Met with pt, pts niece, and great niece in person.  Discussed local financial assistance options.  Patient filled out an application for [employer].  Writer will mail this.    Provided applications for [employer] and the [employer].    Discussed pt's living situation.  Pt appears to be well-supported by having family check in on her almost on a daily basis.  Additionally, pt's great niece is in the process of becoming a CNA and will be living in pt's guest home on the property.  Her niece also spends a lot of time at pt's home and will often spend the night.      Pt has been utilizing local resources such as [employer] and Meals on Wheels.  Pt was in good-spirits and shares that she is very \"blessed\" by the help of family and community.    SW does not have concerns of pt needing additional supports at this time.  Provided writer's business card and encouraged pt and family to reach out if/when needed.  "

## 2024-08-12 ENCOUNTER — DOCUMENTATION ONLY (OUTPATIENT)
Dept: OTHER | Facility: CLINIC | Age: 74
End: 2024-08-12

## 2024-08-13 ENCOUNTER — TELEPHONE (OUTPATIENT)
Dept: RADIATION ONCOLOGY | Facility: HOSPITAL | Age: 74
End: 2024-08-13

## 2024-08-13 PROCEDURE — 77295 3-D RADIOTHERAPY PLAN: CPT | Mod: 26 | Performed by: RADIOLOGY

## 2024-08-13 PROCEDURE — 77300 RADIATION THERAPY DOSE PLAN: CPT | Mod: 26 | Performed by: RADIOLOGY

## 2024-08-13 PROCEDURE — 77300 RADIATION THERAPY DOSE PLAN: CPT | Performed by: RADIOLOGY

## 2024-08-13 PROCEDURE — 77295 3-D RADIOTHERAPY PLAN: CPT | Performed by: RADIOLOGY

## 2024-08-13 PROCEDURE — 77334 RADIATION TREATMENT AID(S): CPT | Performed by: RADIOLOGY

## 2024-08-13 PROCEDURE — 77334 RADIATION TREATMENT AID(S): CPT | Mod: 26 | Performed by: RADIOLOGY

## 2024-08-15 ENCOUNTER — TELEPHONE (OUTPATIENT)
Dept: RADIATION ONCOLOGY | Facility: HOSPITAL | Age: 74
End: 2024-08-15

## 2024-08-15 NOTE — TELEPHONE ENCOUNTER
Left a second message on Rashmi's voicemail informing her we are reaching out to schedule her Aunt Angella's radiation treatments.

## 2024-08-19 ENCOUNTER — DOCUMENTATION ONLY (OUTPATIENT)
Dept: OTHER | Facility: CLINIC | Age: 74
End: 2024-08-19

## 2024-08-21 ENCOUNTER — APPOINTMENT (OUTPATIENT)
Dept: RADIATION ONCOLOGY | Facility: HOSPITAL | Age: 74
End: 2024-08-21
Attending: RADIOLOGY
Payer: MEDICARE

## 2024-08-21 ENCOUNTER — RESULTS ONLY (OUTPATIENT)
Dept: RADIATION ONCOLOGY | Facility: HOSPITAL | Age: 74
End: 2024-08-21

## 2024-08-21 LAB
RAD ONC ARIA COURSE ID: NORMAL
RAD ONC ARIA COURSE LAST TREATMENT DATE: NORMAL
RAD ONC ARIA COURSE START DATE: NORMAL
RAD ONC ARIA COURSE TREATMENT ELAPSED DAYS: 0
RAD ONC ARIA FIRST TREATMENT DATE: NORMAL
RAD ONC ARIA PLAN FRACTIONS TREATED TO DATE: 1
RAD ONC ARIA PLAN FRACTIONS TREATED TO DATE: 1
RAD ONC ARIA PLAN ID: NORMAL
RAD ONC ARIA PLAN ID: NORMAL
RAD ONC ARIA PLAN PRESCRIBED DOSE PER FRACTION: 2 GY
RAD ONC ARIA PLAN PRESCRIBED DOSE PER FRACTION: 2 GY
RAD ONC ARIA PLAN TOTAL FRACTIONS PRESCRIBED: 13
RAD ONC ARIA PLAN TOTAL FRACTIONS PRESCRIBED: 25
RAD ONC ARIA PLAN TOTAL PRESCRIBED DOSE: 2600 CGY
RAD ONC ARIA PLAN TOTAL PRESCRIBED DOSE: 5000 CGY
RAD ONC ARIA REFERENCE POINT DOSAGE GIVEN TO DATE: NORMAL GY
RAD ONC ARIA REFERENCE POINT ID: NORMAL

## 2024-08-21 PROCEDURE — 77387 GUIDANCE FOR RADJ TX DLVR: CPT | Performed by: RADIOLOGY

## 2024-08-21 PROCEDURE — 77412 RADIATION TX DELIVERY LVL 3: CPT | Performed by: RADIOLOGY

## 2024-08-22 ENCOUNTER — RESULTS ONLY (OUTPATIENT)
Dept: RADIATION ONCOLOGY | Facility: HOSPITAL | Age: 74
End: 2024-08-22

## 2024-08-22 ENCOUNTER — APPOINTMENT (OUTPATIENT)
Dept: RADIATION ONCOLOGY | Facility: HOSPITAL | Age: 74
End: 2024-08-22
Payer: COMMERCIAL

## 2024-08-22 LAB
RAD ONC ARIA COURSE ID: NORMAL
RAD ONC ARIA COURSE LAST TREATMENT DATE: NORMAL
RAD ONC ARIA COURSE START DATE: NORMAL
RAD ONC ARIA COURSE TREATMENT ELAPSED DAYS: 1
RAD ONC ARIA FIRST TREATMENT DATE: NORMAL
RAD ONC ARIA PLAN FRACTIONS TREATED TO DATE: 1
RAD ONC ARIA PLAN FRACTIONS TREATED TO DATE: 2
RAD ONC ARIA PLAN ID: NORMAL
RAD ONC ARIA PLAN ID: NORMAL
RAD ONC ARIA PLAN PRESCRIBED DOSE PER FRACTION: 2 GY
RAD ONC ARIA PLAN PRESCRIBED DOSE PER FRACTION: 2 GY
RAD ONC ARIA PLAN TOTAL FRACTIONS PRESCRIBED: 12
RAD ONC ARIA PLAN TOTAL FRACTIONS PRESCRIBED: 25
RAD ONC ARIA PLAN TOTAL PRESCRIBED DOSE: 2400 CGY
RAD ONC ARIA PLAN TOTAL PRESCRIBED DOSE: 5000 CGY
RAD ONC ARIA REFERENCE POINT DOSAGE GIVEN TO DATE: NORMAL GY
RAD ONC ARIA REFERENCE POINT ID: NORMAL

## 2024-08-22 PROCEDURE — 77387 GUIDANCE FOR RADJ TX DLVR: CPT | Performed by: RADIOLOGY

## 2024-08-22 PROCEDURE — 77412 RADIATION TX DELIVERY LVL 3: CPT | Performed by: RADIOLOGY

## 2024-08-22 PROCEDURE — G6002 STEREOSCOPIC X-RAY GUIDANCE: HCPCS | Mod: 26 | Performed by: RADIOLOGY

## 2024-08-23 ENCOUNTER — APPOINTMENT (OUTPATIENT)
Dept: RADIATION ONCOLOGY | Facility: HOSPITAL | Age: 74
End: 2024-08-23
Payer: COMMERCIAL

## 2024-08-23 ENCOUNTER — RESULTS ONLY (OUTPATIENT)
Dept: RADIATION ONCOLOGY | Facility: HOSPITAL | Age: 74
End: 2024-08-23

## 2024-08-23 LAB
RAD ONC ARIA COURSE ID: NORMAL
RAD ONC ARIA COURSE LAST TREATMENT DATE: NORMAL
RAD ONC ARIA COURSE START DATE: NORMAL
RAD ONC ARIA COURSE TREATMENT ELAPSED DAYS: 2
RAD ONC ARIA FIRST TREATMENT DATE: NORMAL
RAD ONC ARIA PLAN FRACTIONS TREATED TO DATE: 2
RAD ONC ARIA PLAN FRACTIONS TREATED TO DATE: 3
RAD ONC ARIA PLAN ID: NORMAL
RAD ONC ARIA PLAN ID: NORMAL
RAD ONC ARIA PLAN PRESCRIBED DOSE PER FRACTION: 2 GY
RAD ONC ARIA PLAN PRESCRIBED DOSE PER FRACTION: 2 GY
RAD ONC ARIA PLAN TOTAL FRACTIONS PRESCRIBED: 13
RAD ONC ARIA PLAN TOTAL FRACTIONS PRESCRIBED: 25
RAD ONC ARIA PLAN TOTAL PRESCRIBED DOSE: 2600 CGY
RAD ONC ARIA PLAN TOTAL PRESCRIBED DOSE: 5000 CGY
RAD ONC ARIA REFERENCE POINT DOSAGE GIVEN TO DATE: NORMAL GY
RAD ONC ARIA REFERENCE POINT ID: NORMAL

## 2024-08-23 PROCEDURE — 77412 RADIATION TX DELIVERY LVL 3: CPT | Performed by: RADIOLOGY

## 2024-08-23 PROCEDURE — G6002 STEREOSCOPIC X-RAY GUIDANCE: HCPCS | Mod: 26 | Performed by: RADIOLOGY

## 2024-08-23 PROCEDURE — 77387 GUIDANCE FOR RADJ TX DLVR: CPT | Performed by: RADIOLOGY

## 2024-08-26 ENCOUNTER — APPOINTMENT (OUTPATIENT)
Dept: RADIATION ONCOLOGY | Facility: HOSPITAL | Age: 74
End: 2024-08-26
Payer: COMMERCIAL

## 2024-08-26 ENCOUNTER — RESULTS ONLY (OUTPATIENT)
Dept: RADIATION ONCOLOGY | Facility: HOSPITAL | Age: 74
End: 2024-08-26

## 2024-08-26 LAB
RAD ONC ARIA COURSE ID: NORMAL
RAD ONC ARIA COURSE LAST TREATMENT DATE: NORMAL
RAD ONC ARIA COURSE START DATE: NORMAL
RAD ONC ARIA COURSE TREATMENT ELAPSED DAYS: 5
RAD ONC ARIA FIRST TREATMENT DATE: NORMAL
RAD ONC ARIA PLAN FRACTIONS TREATED TO DATE: 2
RAD ONC ARIA PLAN FRACTIONS TREATED TO DATE: 4
RAD ONC ARIA PLAN ID: NORMAL
RAD ONC ARIA PLAN ID: NORMAL
RAD ONC ARIA PLAN PRESCRIBED DOSE PER FRACTION: 2 GY
RAD ONC ARIA PLAN PRESCRIBED DOSE PER FRACTION: 2 GY
RAD ONC ARIA PLAN TOTAL FRACTIONS PRESCRIBED: 12
RAD ONC ARIA PLAN TOTAL FRACTIONS PRESCRIBED: 25
RAD ONC ARIA PLAN TOTAL PRESCRIBED DOSE: 2400 CGY
RAD ONC ARIA PLAN TOTAL PRESCRIBED DOSE: 5000 CGY
RAD ONC ARIA REFERENCE POINT DOSAGE GIVEN TO DATE: NORMAL GY
RAD ONC ARIA REFERENCE POINT ID: NORMAL

## 2024-08-26 PROCEDURE — 77412 RADIATION TX DELIVERY LVL 3: CPT | Performed by: RADIOLOGY

## 2024-08-26 PROCEDURE — G6002 STEREOSCOPIC X-RAY GUIDANCE: HCPCS | Mod: 26 | Performed by: RADIOLOGY

## 2024-08-26 PROCEDURE — 77387 GUIDANCE FOR RADJ TX DLVR: CPT | Performed by: RADIOLOGY

## 2024-08-27 ENCOUNTER — OFFICE VISIT (OUTPATIENT)
Dept: RADIATION ONCOLOGY | Facility: HOSPITAL | Age: 74
End: 2024-08-27
Attending: RADIOLOGY
Payer: MEDICARE

## 2024-08-27 ENCOUNTER — RESULTS ONLY (OUTPATIENT)
Dept: RADIATION ONCOLOGY | Facility: HOSPITAL | Age: 74
End: 2024-08-27

## 2024-08-27 VITALS
TEMPERATURE: 98.5 F | HEART RATE: 92 BPM | WEIGHT: 156.7 LBS | RESPIRATION RATE: 16 BRPM | DIASTOLIC BLOOD PRESSURE: 82 MMHG | OXYGEN SATURATION: 98 % | SYSTOLIC BLOOD PRESSURE: 116 MMHG | BODY MASS INDEX: 26.9 KG/M2

## 2024-08-27 DIAGNOSIS — C50.912 BREAST CANCER, STAGE 2, LEFT (H): Primary | ICD-10-CM

## 2024-08-27 LAB
RAD ONC ARIA COURSE ID: NORMAL
RAD ONC ARIA COURSE LAST TREATMENT DATE: NORMAL
RAD ONC ARIA COURSE START DATE: NORMAL
RAD ONC ARIA COURSE TREATMENT ELAPSED DAYS: 6
RAD ONC ARIA FIRST TREATMENT DATE: NORMAL
RAD ONC ARIA PLAN FRACTIONS TREATED TO DATE: 3
RAD ONC ARIA PLAN FRACTIONS TREATED TO DATE: 5
RAD ONC ARIA PLAN ID: NORMAL
RAD ONC ARIA PLAN ID: NORMAL
RAD ONC ARIA PLAN PRESCRIBED DOSE PER FRACTION: 2 GY
RAD ONC ARIA PLAN PRESCRIBED DOSE PER FRACTION: 2 GY
RAD ONC ARIA PLAN TOTAL FRACTIONS PRESCRIBED: 13
RAD ONC ARIA PLAN TOTAL FRACTIONS PRESCRIBED: 25
RAD ONC ARIA PLAN TOTAL PRESCRIBED DOSE: 2600 CGY
RAD ONC ARIA PLAN TOTAL PRESCRIBED DOSE: 5000 CGY
RAD ONC ARIA REFERENCE POINT DOSAGE GIVEN TO DATE: NORMAL GY
RAD ONC ARIA REFERENCE POINT ID: NORMAL

## 2024-08-27 ASSESSMENT — PAIN SCALES - GENERAL: PAINLEVEL: NO PAIN (0)

## 2024-08-27 NOTE — PROGRESS NOTES
Progress Notes  Encounter Date: Aug 27, 2024  RADIATION ONCOLOGY WEEKLY MANAGEMENT PROGRESS NOTE     Patient Care Team         Relationship Specialty Notifications Start End    Kenton Pena PCP - General   5/20/13     Phone: [phone] Fax: [phone]         [address] [place] [address]    Sally Adrian APRN CNP Nurse Practitioner Radiation Oncology  7/30/24     Phone: [phone] Fax: [phone]         [address] [place] [address]    Friday, Norris BUTT MD  Hematology & Oncology  7/30/24     Phone: [phone] Fax: [phone]         [address] [facility] [address]    Sally Wolfe MD MD Surgery  7/30/24     Phone: [phone] Fax: [phone]         [address] [place] [address]    Bobyb Lai MD Assigned Cancer Care Provider   8/23/24     Phone: [phone] Fax: [phone]         [address] [place] [address]                    DIAGNOSIS:  Cancer Staging   No matching staging information was found for the patient.          RADIATION THERAPY:    Anglela Ley has received 1000 cGy to date to the left chest wall and regional nodes..   Treatment 5/25  Total planned dose: 5000 cGy      SUBJECTIVE:    Currently she has no complaints concerning the skin, pulmonary or cardiac systems.      OBJECTIVE:    WEIGHT: 156 lbs 11.2 oz. /82   Pulse 92   Temp 98.5  F (36.9  C)   Resp 16   Wt 71.1 kg (156 lb 11.2 oz)   SpO2 98%   BMI 26.90 kg/m    Examination reveals no erythema.        IMPRESSION:  Routine tolerance to radiation therapy.       PLAN:  Continue treatment as planned .        Covering locum provider was present during face to face encounter with patient and has reviewed medical record and imaging.      Bobby Lai MD

## 2024-08-28 ENCOUNTER — RESULTS ONLY (OUTPATIENT)
Dept: RADIATION ONCOLOGY | Facility: HOSPITAL | Age: 74
End: 2024-08-28

## 2024-08-28 ENCOUNTER — APPOINTMENT (OUTPATIENT)
Dept: RADIATION ONCOLOGY | Facility: HOSPITAL | Age: 74
End: 2024-08-28
Payer: COMMERCIAL

## 2024-08-28 LAB
RAD ONC ARIA COURSE ID: NORMAL
RAD ONC ARIA COURSE LAST TREATMENT DATE: NORMAL
RAD ONC ARIA COURSE START DATE: NORMAL
RAD ONC ARIA COURSE TREATMENT ELAPSED DAYS: 7
RAD ONC ARIA FIRST TREATMENT DATE: NORMAL
RAD ONC ARIA PLAN FRACTIONS TREATED TO DATE: 3
RAD ONC ARIA PLAN FRACTIONS TREATED TO DATE: 6
RAD ONC ARIA PLAN ID: NORMAL
RAD ONC ARIA PLAN ID: NORMAL
RAD ONC ARIA PLAN PRESCRIBED DOSE PER FRACTION: 2 GY
RAD ONC ARIA PLAN PRESCRIBED DOSE PER FRACTION: 2 GY
RAD ONC ARIA PLAN TOTAL FRACTIONS PRESCRIBED: 12
RAD ONC ARIA PLAN TOTAL FRACTIONS PRESCRIBED: 25
RAD ONC ARIA PLAN TOTAL PRESCRIBED DOSE: 2400 CGY
RAD ONC ARIA PLAN TOTAL PRESCRIBED DOSE: 5000 CGY
RAD ONC ARIA REFERENCE POINT DOSAGE GIVEN TO DATE: NORMAL GY
RAD ONC ARIA REFERENCE POINT ID: NORMAL

## 2024-08-28 PROCEDURE — 77412 RADIATION TX DELIVERY LVL 3: CPT | Performed by: RADIOLOGY

## 2024-08-28 PROCEDURE — G6002 STEREOSCOPIC X-RAY GUIDANCE: HCPCS | Mod: 26 | Performed by: RADIOLOGY

## 2024-08-28 PROCEDURE — 77387 GUIDANCE FOR RADJ TX DLVR: CPT | Performed by: RADIOLOGY

## 2024-08-29 ENCOUNTER — APPOINTMENT (OUTPATIENT)
Dept: RADIATION ONCOLOGY | Facility: HOSPITAL | Age: 74
End: 2024-08-29
Payer: COMMERCIAL

## 2024-08-29 ENCOUNTER — RESULTS ONLY (OUTPATIENT)
Dept: RADIATION ONCOLOGY | Facility: HOSPITAL | Age: 74
End: 2024-08-29

## 2024-08-29 LAB
RAD ONC ARIA COURSE ID: NORMAL
RAD ONC ARIA COURSE LAST TREATMENT DATE: NORMAL
RAD ONC ARIA COURSE START DATE: NORMAL
RAD ONC ARIA COURSE TREATMENT ELAPSED DAYS: 8
RAD ONC ARIA FIRST TREATMENT DATE: NORMAL
RAD ONC ARIA PLAN FRACTIONS TREATED TO DATE: 4
RAD ONC ARIA PLAN FRACTIONS TREATED TO DATE: 7
RAD ONC ARIA PLAN ID: NORMAL
RAD ONC ARIA PLAN ID: NORMAL
RAD ONC ARIA PLAN PRESCRIBED DOSE PER FRACTION: 2 GY
RAD ONC ARIA PLAN PRESCRIBED DOSE PER FRACTION: 2 GY
RAD ONC ARIA PLAN TOTAL FRACTIONS PRESCRIBED: 13
RAD ONC ARIA PLAN TOTAL FRACTIONS PRESCRIBED: 25
RAD ONC ARIA PLAN TOTAL PRESCRIBED DOSE: 2600 CGY
RAD ONC ARIA PLAN TOTAL PRESCRIBED DOSE: 5000 CGY
RAD ONC ARIA REFERENCE POINT DOSAGE GIVEN TO DATE: NORMAL GY
RAD ONC ARIA REFERENCE POINT ID: NORMAL

## 2024-08-29 PROCEDURE — G6002 STEREOSCOPIC X-RAY GUIDANCE: HCPCS | Mod: 26 | Performed by: RADIOLOGY

## 2024-08-29 PROCEDURE — 77412 RADIATION TX DELIVERY LVL 3: CPT | Performed by: RADIOLOGY

## 2024-08-29 PROCEDURE — 77387 GUIDANCE FOR RADJ TX DLVR: CPT | Performed by: RADIOLOGY

## 2024-08-30 ENCOUNTER — RESULTS ONLY (OUTPATIENT)
Dept: RADIATION ONCOLOGY | Facility: HOSPITAL | Age: 74
End: 2024-08-30

## 2024-08-30 ENCOUNTER — APPOINTMENT (OUTPATIENT)
Dept: RADIATION ONCOLOGY | Facility: HOSPITAL | Age: 74
End: 2024-08-30
Payer: COMMERCIAL

## 2024-08-30 LAB
RAD ONC ARIA COURSE ID: NORMAL
RAD ONC ARIA COURSE LAST TREATMENT DATE: NORMAL
RAD ONC ARIA COURSE START DATE: NORMAL
RAD ONC ARIA COURSE TREATMENT ELAPSED DAYS: 9
RAD ONC ARIA FIRST TREATMENT DATE: NORMAL
RAD ONC ARIA PLAN FRACTIONS TREATED TO DATE: 4
RAD ONC ARIA PLAN FRACTIONS TREATED TO DATE: 8
RAD ONC ARIA PLAN ID: NORMAL
RAD ONC ARIA PLAN ID: NORMAL
RAD ONC ARIA PLAN PRESCRIBED DOSE PER FRACTION: 2 GY
RAD ONC ARIA PLAN PRESCRIBED DOSE PER FRACTION: 2 GY
RAD ONC ARIA PLAN TOTAL FRACTIONS PRESCRIBED: 12
RAD ONC ARIA PLAN TOTAL FRACTIONS PRESCRIBED: 25
RAD ONC ARIA PLAN TOTAL PRESCRIBED DOSE: 2400 CGY
RAD ONC ARIA PLAN TOTAL PRESCRIBED DOSE: 5000 CGY
RAD ONC ARIA REFERENCE POINT DOSAGE GIVEN TO DATE: NORMAL GY
RAD ONC ARIA REFERENCE POINT ID: NORMAL

## 2024-08-30 PROCEDURE — 77387 GUIDANCE FOR RADJ TX DLVR: CPT | Performed by: RADIOLOGY

## 2024-08-30 PROCEDURE — G6002 STEREOSCOPIC X-RAY GUIDANCE: HCPCS | Mod: 26 | Performed by: RADIOLOGY

## 2024-08-30 PROCEDURE — 77412 RADIATION TX DELIVERY LVL 3: CPT | Performed by: RADIOLOGY

## 2024-08-30 PROCEDURE — 77427 RADIATION TX MANAGEMENT X5: CPT | Performed by: RADIOLOGY

## 2024-09-03 ENCOUNTER — APPOINTMENT (OUTPATIENT)
Dept: RADIATION ONCOLOGY | Facility: HOSPITAL | Age: 74
End: 2024-09-03
Payer: MEDICARE

## 2024-09-03 ENCOUNTER — RESULTS ONLY (OUTPATIENT)
Dept: RADIATION ONCOLOGY | Facility: HOSPITAL | Age: 74
End: 2024-09-03

## 2024-09-03 ENCOUNTER — OFFICE VISIT (OUTPATIENT)
Dept: RADIATION ONCOLOGY | Facility: HOSPITAL | Age: 74
End: 2024-09-03
Payer: MEDICARE

## 2024-09-03 VITALS
WEIGHT: 155.7 LBS | RESPIRATION RATE: 16 BRPM | HEART RATE: 88 BPM | OXYGEN SATURATION: 98 % | DIASTOLIC BLOOD PRESSURE: 78 MMHG | BODY MASS INDEX: 26.73 KG/M2 | SYSTOLIC BLOOD PRESSURE: 136 MMHG

## 2024-09-03 DIAGNOSIS — C50.912 BREAST CANCER, STAGE 2, LEFT (H): Primary | ICD-10-CM

## 2024-09-03 LAB
RAD ONC ARIA COURSE ID: NORMAL
RAD ONC ARIA COURSE LAST TREATMENT DATE: NORMAL
RAD ONC ARIA COURSE START DATE: NORMAL
RAD ONC ARIA COURSE TREATMENT ELAPSED DAYS: 13
RAD ONC ARIA FIRST TREATMENT DATE: NORMAL
RAD ONC ARIA PLAN FRACTIONS TREATED TO DATE: 5
RAD ONC ARIA PLAN FRACTIONS TREATED TO DATE: 9
RAD ONC ARIA PLAN ID: NORMAL
RAD ONC ARIA PLAN ID: NORMAL
RAD ONC ARIA PLAN PRESCRIBED DOSE PER FRACTION: 2 GY
RAD ONC ARIA PLAN PRESCRIBED DOSE PER FRACTION: 2 GY
RAD ONC ARIA PLAN TOTAL FRACTIONS PRESCRIBED: 13
RAD ONC ARIA PLAN TOTAL FRACTIONS PRESCRIBED: 25
RAD ONC ARIA PLAN TOTAL PRESCRIBED DOSE: 2600 CGY
RAD ONC ARIA PLAN TOTAL PRESCRIBED DOSE: 5000 CGY
RAD ONC ARIA REFERENCE POINT DOSAGE GIVEN TO DATE: NORMAL GY
RAD ONC ARIA REFERENCE POINT ID: NORMAL

## 2024-09-03 ASSESSMENT — PAIN SCALES - GENERAL: PAINLEVEL: NO PAIN (0)

## 2024-09-03 NOTE — PROGRESS NOTES
Progress Notes  Encounter Date: Sep 3, 2024  RADIATION ONCOLOGY WEEKLY MANAGEMENT PROGRESS NOTE     Patient Care Team         Relationship Specialty Notifications Start End    Kenton Pena PCP - General   5/20/13     Phone: [phone] Fax: [phone]         [address] [place] [address]    Sally Adrian APRN CNP Nurse Practitioner Radiation Oncology  7/30/24     Phone: [phone] Fax: [phone]         [address] [place] [address]    Friday, Norris BUTT MD  Hematology & Oncology  7/30/24     Phone: [phone] Fax: [phone]         [address] [facility] [address]    Sally Wolfe MD MD Surgery  7/30/24     Phone: [phone] Fax: [phone]         [address] [place] [address]    Bobby Lai MD Assigned Cancer Care Provider   8/23/24     Phone: [phone] Fax: [phone]         [address] [place] [address]              DIAGNOSIS:  Cancer Staging   No matching staging information was found for the patient.        RADIATION THERAPY:    Angella Ley has received 1800 cGy to date to the left chest wall and regional nodes..   Treatment 9/25  Total planned dose: 5000 cGy      SUBJECTIVE:    Currently she has no complaints concerning the skin, pulmonary or cardiac systems.      OBJECTIVE:    WEIGHT: 155 lbs 11.2 oz. /78 (BP Location: Left arm, Patient Position: Chair, Cuff Size: Adult Regular)   Pulse 88   Resp 16   Wt 70.6 kg (155 lb 11.2 oz)   SpO2 98%   BMI 26.73 kg/m    Examination reveals Rash in upper inner quadrant and low neck with a papillary component .      IMPRESSION:  Routine tolerance to radiation therapy. Rash as noted.      PLAN:  Continue treatment as planned . Start Miaderm and add triple antibiotic to rashy skin.       Covering locum provider was present during face to face encounter with patient and has reviewed medical record and imaging.      Bobby Lai MD

## 2024-09-04 ENCOUNTER — INFUSION THERAPY VISIT (OUTPATIENT)
Dept: INFUSION THERAPY | Facility: OTHER | Age: 74
End: 2024-09-04
Attending: FAMILY MEDICINE
Payer: MEDICARE

## 2024-09-04 ENCOUNTER — RESULTS ONLY (OUTPATIENT)
Dept: RADIATION ONCOLOGY | Facility: HOSPITAL | Age: 74
End: 2024-09-04

## 2024-09-04 ENCOUNTER — APPOINTMENT (OUTPATIENT)
Dept: RADIATION ONCOLOGY | Facility: HOSPITAL | Age: 74
End: 2024-09-04
Payer: COMMERCIAL

## 2024-09-04 VITALS
WEIGHT: 153.88 LBS | HEIGHT: 64 IN | TEMPERATURE: 98.3 F | HEART RATE: 81 BPM | SYSTOLIC BLOOD PRESSURE: 123 MMHG | DIASTOLIC BLOOD PRESSURE: 81 MMHG | OXYGEN SATURATION: 98 % | BODY MASS INDEX: 26.27 KG/M2

## 2024-09-04 DIAGNOSIS — M05.9 RHEUMATOID ARTHRITIS WITH POSITIVE RHEUMATOID FACTOR, INVOLVING UNSPECIFIED SITE (H): Primary | ICD-10-CM

## 2024-09-04 LAB
ALT SERPL W P-5'-P-CCNC: 12 U/L (ref 0–50)
CREAT SERPL-MCNC: 0.75 MG/DL (ref 0.51–0.95)
CRP SERPL-MCNC: 6.61 MG/L
EGFRCR SERPLBLD CKD-EPI 2021: 83 ML/MIN/1.73M2
ERYTHROCYTE [SEDIMENTATION RATE] IN BLOOD BY WESTERGREN METHOD: 9 MM/HR (ref 0–30)
HGB BLD-MCNC: 16 G/DL (ref 11.7–15.7)
PLATELET # BLD AUTO: 300 10E3/UL (ref 150–450)
RAD ONC ARIA COURSE ID: NORMAL
RAD ONC ARIA COURSE LAST TREATMENT DATE: NORMAL
RAD ONC ARIA COURSE START DATE: NORMAL
RAD ONC ARIA COURSE TREATMENT ELAPSED DAYS: 14
RAD ONC ARIA FIRST TREATMENT DATE: NORMAL
RAD ONC ARIA PLAN FRACTIONS TREATED TO DATE: 10
RAD ONC ARIA PLAN FRACTIONS TREATED TO DATE: 5
RAD ONC ARIA PLAN ID: NORMAL
RAD ONC ARIA PLAN ID: NORMAL
RAD ONC ARIA PLAN PRESCRIBED DOSE PER FRACTION: 2 GY
RAD ONC ARIA PLAN PRESCRIBED DOSE PER FRACTION: 2 GY
RAD ONC ARIA PLAN TOTAL FRACTIONS PRESCRIBED: 12
RAD ONC ARIA PLAN TOTAL FRACTIONS PRESCRIBED: 25
RAD ONC ARIA PLAN TOTAL PRESCRIBED DOSE: 2400 CGY
RAD ONC ARIA PLAN TOTAL PRESCRIBED DOSE: 5000 CGY
RAD ONC ARIA REFERENCE POINT DOSAGE GIVEN TO DATE: NORMAL GY
RAD ONC ARIA REFERENCE POINT ID: NORMAL
WBC # BLD AUTO: 11 10E3/UL (ref 4–11)

## 2024-09-04 PROCEDURE — 85048 AUTOMATED LEUKOCYTE COUNT: CPT | Mod: ZL

## 2024-09-04 PROCEDURE — 36415 COLL VENOUS BLD VENIPUNCTURE: CPT

## 2024-09-04 PROCEDURE — 77336 RADIATION PHYSICS CONSULT: CPT | Performed by: RADIOLOGY

## 2024-09-04 PROCEDURE — 84460 ALANINE AMINO (ALT) (SGPT): CPT | Mod: ZL

## 2024-09-04 PROCEDURE — G6002 STEREOSCOPIC X-RAY GUIDANCE: HCPCS | Mod: 26 | Performed by: RADIOLOGY

## 2024-09-04 PROCEDURE — 82565 ASSAY OF CREATININE: CPT | Mod: ZL

## 2024-09-04 PROCEDURE — 85652 RBC SED RATE AUTOMATED: CPT | Mod: ZL

## 2024-09-04 PROCEDURE — 86140 C-REACTIVE PROTEIN: CPT | Mod: ZL

## 2024-09-04 PROCEDURE — 85018 HEMOGLOBIN: CPT | Mod: ZL

## 2024-09-04 PROCEDURE — 77412 RADIATION TX DELIVERY LVL 3: CPT | Performed by: RADIOLOGY

## 2024-09-04 PROCEDURE — 77387 GUIDANCE FOR RADJ TX DLVR: CPT | Performed by: RADIOLOGY

## 2024-09-04 PROCEDURE — 85049 AUTOMATED PLATELET COUNT: CPT | Mod: ZL

## 2024-09-04 RX ORDER — ALBUTEROL SULFATE 90 UG/1
1-2 AEROSOL, METERED RESPIRATORY (INHALATION)
Status: CANCELLED
Start: 2024-10-30

## 2024-09-04 RX ORDER — HEPARIN SODIUM (PORCINE) LOCK FLUSH IV SOLN 100 UNIT/ML 100 UNIT/ML
5 SOLUTION INTRAVENOUS
Status: CANCELLED | OUTPATIENT
Start: 2024-10-30

## 2024-09-04 RX ORDER — DIPHENHYDRAMINE HYDROCHLORIDE 50 MG/ML
50 INJECTION INTRAMUSCULAR; INTRAVENOUS
Status: CANCELLED
Start: 2024-10-30

## 2024-09-04 RX ORDER — EPINEPHRINE 1 MG/ML
0.3 INJECTION, SOLUTION, CONCENTRATE INTRAVENOUS EVERY 5 MIN PRN
Status: CANCELLED | OUTPATIENT
Start: 2024-10-30

## 2024-09-04 RX ORDER — HEPARIN SODIUM,PORCINE 10 UNIT/ML
5-20 VIAL (ML) INTRAVENOUS DAILY PRN
Status: CANCELLED | OUTPATIENT
Start: 2024-10-30

## 2024-09-04 RX ORDER — ALBUTEROL SULFATE 0.83 MG/ML
2.5 SOLUTION RESPIRATORY (INHALATION)
Status: CANCELLED | OUTPATIENT
Start: 2024-10-30

## 2024-09-04 RX ORDER — MEPERIDINE HYDROCHLORIDE 25 MG/ML
25 INJECTION INTRAMUSCULAR; INTRAVENOUS; SUBCUTANEOUS EVERY 30 MIN PRN
Status: CANCELLED | OUTPATIENT
Start: 2024-10-30

## 2024-09-04 RX ORDER — METHYLPREDNISOLONE SODIUM SUCCINATE 125 MG/2ML
125 INJECTION, POWDER, LYOPHILIZED, FOR SOLUTION INTRAMUSCULAR; INTRAVENOUS
Status: CANCELLED
Start: 2024-10-30

## 2024-09-04 ASSESSMENT — PAIN SCALES - GENERAL: PAINLEVEL: NO PAIN (0)

## 2024-09-05 ENCOUNTER — APPOINTMENT (OUTPATIENT)
Dept: RADIATION ONCOLOGY | Facility: HOSPITAL | Age: 74
End: 2024-09-05
Payer: COMMERCIAL

## 2024-09-05 ENCOUNTER — TELEPHONE (OUTPATIENT)
Dept: INFUSION THERAPY | Facility: OTHER | Age: 74
End: 2024-09-05

## 2024-09-05 ENCOUNTER — RESULTS ONLY (OUTPATIENT)
Dept: RADIATION ONCOLOGY | Facility: HOSPITAL | Age: 74
End: 2024-09-05

## 2024-09-05 LAB
RAD ONC ARIA COURSE ID: NORMAL
RAD ONC ARIA COURSE LAST TREATMENT DATE: NORMAL
RAD ONC ARIA COURSE START DATE: NORMAL
RAD ONC ARIA COURSE TREATMENT ELAPSED DAYS: 15
RAD ONC ARIA FIRST TREATMENT DATE: NORMAL
RAD ONC ARIA PLAN FRACTIONS TREATED TO DATE: 11
RAD ONC ARIA PLAN FRACTIONS TREATED TO DATE: 6
RAD ONC ARIA PLAN ID: NORMAL
RAD ONC ARIA PLAN ID: NORMAL
RAD ONC ARIA PLAN PRESCRIBED DOSE PER FRACTION: 2 GY
RAD ONC ARIA PLAN PRESCRIBED DOSE PER FRACTION: 2 GY
RAD ONC ARIA PLAN TOTAL FRACTIONS PRESCRIBED: 13
RAD ONC ARIA PLAN TOTAL FRACTIONS PRESCRIBED: 25
RAD ONC ARIA PLAN TOTAL PRESCRIBED DOSE: 2600 CGY
RAD ONC ARIA PLAN TOTAL PRESCRIBED DOSE: 5000 CGY
RAD ONC ARIA REFERENCE POINT DOSAGE GIVEN TO DATE: NORMAL GY
RAD ONC ARIA REFERENCE POINT ID: NORMAL

## 2024-09-05 PROCEDURE — 77412 RADIATION TX DELIVERY LVL 3: CPT | Performed by: RADIOLOGY

## 2024-09-05 PROCEDURE — G6002 STEREOSCOPIC X-RAY GUIDANCE: HCPCS | Mod: 26 | Performed by: RADIOLOGY

## 2024-09-05 PROCEDURE — 77387 GUIDANCE FOR RADJ TX DLVR: CPT | Performed by: RADIOLOGY

## 2024-09-05 NOTE — PROGRESS NOTES
Call placed to [facility] notifying provider that patient was unable to receive her Simponi Aria yesterday and that patient is now authorized for treatment, inquiring if we can use labs drawn 9/4/24 for scheduled infusion 9/9/24.  Dr Taylor MERIDA OK to use labs from 9/4/24 for 9/9/24 infusion.

## 2024-09-06 ENCOUNTER — RESULTS ONLY (OUTPATIENT)
Dept: RADIATION ONCOLOGY | Facility: HOSPITAL | Age: 74
End: 2024-09-06

## 2024-09-06 ENCOUNTER — APPOINTMENT (OUTPATIENT)
Dept: RADIATION ONCOLOGY | Facility: HOSPITAL | Age: 74
End: 2024-09-06
Payer: COMMERCIAL

## 2024-09-06 LAB
RAD ONC ARIA COURSE ID: NORMAL
RAD ONC ARIA COURSE LAST TREATMENT DATE: NORMAL
RAD ONC ARIA COURSE START DATE: NORMAL
RAD ONC ARIA COURSE TREATMENT ELAPSED DAYS: 16
RAD ONC ARIA FIRST TREATMENT DATE: NORMAL
RAD ONC ARIA PLAN FRACTIONS TREATED TO DATE: 12
RAD ONC ARIA PLAN FRACTIONS TREATED TO DATE: 6
RAD ONC ARIA PLAN ID: NORMAL
RAD ONC ARIA PLAN ID: NORMAL
RAD ONC ARIA PLAN PRESCRIBED DOSE PER FRACTION: 2 GY
RAD ONC ARIA PLAN PRESCRIBED DOSE PER FRACTION: 2 GY
RAD ONC ARIA PLAN TOTAL FRACTIONS PRESCRIBED: 12
RAD ONC ARIA PLAN TOTAL FRACTIONS PRESCRIBED: 25
RAD ONC ARIA PLAN TOTAL PRESCRIBED DOSE: 2400 CGY
RAD ONC ARIA PLAN TOTAL PRESCRIBED DOSE: 5000 CGY
RAD ONC ARIA REFERENCE POINT DOSAGE GIVEN TO DATE: NORMAL GY
RAD ONC ARIA REFERENCE POINT ID: NORMAL

## 2024-09-06 PROCEDURE — 77412 RADIATION TX DELIVERY LVL 3: CPT | Performed by: RADIOLOGY

## 2024-09-06 PROCEDURE — G6002 STEREOSCOPIC X-RAY GUIDANCE: HCPCS | Mod: 26 | Performed by: RADIOLOGY

## 2024-09-06 PROCEDURE — 77387 GUIDANCE FOR RADJ TX DLVR: CPT | Performed by: RADIOLOGY

## 2024-09-09 ENCOUNTER — TELEPHONE (OUTPATIENT)
Dept: RADIATION ONCOLOGY | Facility: HOSPITAL | Age: 74
End: 2024-09-09

## 2024-09-09 ENCOUNTER — INFUSION THERAPY VISIT (OUTPATIENT)
Dept: INFUSION THERAPY | Facility: OTHER | Age: 74
End: 2024-09-09
Attending: FAMILY MEDICINE
Payer: MEDICARE

## 2024-09-09 VITALS
WEIGHT: 153 LBS | OXYGEN SATURATION: 95 % | BODY MASS INDEX: 26.26 KG/M2 | TEMPERATURE: 97.6 F | DIASTOLIC BLOOD PRESSURE: 79 MMHG | SYSTOLIC BLOOD PRESSURE: 141 MMHG | HEART RATE: 83 BPM

## 2024-09-09 DIAGNOSIS — M05.9 RHEUMATOID ARTHRITIS WITH POSITIVE RHEUMATOID FACTOR, INVOLVING UNSPECIFIED SITE (H): Primary | ICD-10-CM

## 2024-09-09 PROCEDURE — 96365 THER/PROPH/DIAG IV INF INIT: CPT

## 2024-09-09 PROCEDURE — 250N000011 HC RX IP 250 OP 636: Mod: JW | Performed by: FAMILY MEDICINE

## 2024-09-09 RX ORDER — HEPARIN SODIUM,PORCINE 10 UNIT/ML
5-20 VIAL (ML) INTRAVENOUS DAILY PRN
OUTPATIENT
Start: 2024-11-04

## 2024-09-09 RX ORDER — METHYLPREDNISOLONE SODIUM SUCCINATE 125 MG/2ML
125 INJECTION, POWDER, LYOPHILIZED, FOR SOLUTION INTRAMUSCULAR; INTRAVENOUS
Start: 2024-11-04

## 2024-09-09 RX ORDER — EPINEPHRINE 1 MG/ML
0.3 INJECTION, SOLUTION, CONCENTRATE INTRAVENOUS EVERY 5 MIN PRN
OUTPATIENT
Start: 2024-11-04

## 2024-09-09 RX ORDER — MEPERIDINE HYDROCHLORIDE 25 MG/ML
25 INJECTION INTRAMUSCULAR; INTRAVENOUS; SUBCUTANEOUS EVERY 30 MIN PRN
OUTPATIENT
Start: 2024-11-04

## 2024-09-09 RX ORDER — DIPHENHYDRAMINE HYDROCHLORIDE 50 MG/ML
50 INJECTION INTRAMUSCULAR; INTRAVENOUS
Start: 2024-11-04

## 2024-09-09 RX ORDER — ALBUTEROL SULFATE 90 UG/1
1-2 AEROSOL, METERED RESPIRATORY (INHALATION)
Start: 2024-11-04

## 2024-09-09 RX ORDER — HEPARIN SODIUM (PORCINE) LOCK FLUSH IV SOLN 100 UNIT/ML 100 UNIT/ML
5 SOLUTION INTRAVENOUS
OUTPATIENT
Start: 2024-11-04

## 2024-09-09 RX ORDER — ALBUTEROL SULFATE 0.83 MG/ML
2.5 SOLUTION RESPIRATORY (INHALATION)
OUTPATIENT
Start: 2024-11-04

## 2024-09-09 RX ADMIN — GOLIMUMAB 139 MG: 50 SOLUTION INTRAVENOUS at 11:59

## 2024-09-09 RX ADMIN — Medication 250 ML: at 11:59

## 2024-09-09 ASSESSMENT — PAIN SCALES - GENERAL: PAINLEVEL: NO PAIN (0)

## 2024-09-09 NOTE — PROGRESS NOTES
Progress Notes  Encounter Date: Sep 10, 2024  RADIATION ONCOLOGY WEEKLY MANAGEMENT PROGRESS NOTE     Patient Care Team         Relationship Specialty Notifications Start End    Kenton Pena PCP - General   5/20/13     Phone: [phone] Fax: [phone]         [address] [place] [address]    Sally Adrian APRN CNP Nurse Practitioner Radiation Oncology  7/30/24     Phone: [phone] Fax: [phone]         [address] [place] [address]    Friday, Norris BUTT MD  Hematology & Oncology  7/30/24     Phone: [phone] Fax: [phone]         [address] [facility] [address]    Sally Wolfe MD MD Surgery  7/30/24     Phone: [phone] Fax: [phone]         [address] [place] [address]    Bobby Lai MD Assigned Cancer Care Provider   8/23/24     Phone: [phone] Fax: [phone]         [address] [place] [address]              DIAGNOSIS:  Cancer Staging   No matching staging information was found for the patient.        RADIATION THERAPY:    Angella Ley has received 2600 cGy to date to the left chest wall and regional nodes..   Treatment 13/25  Total planned dose: 5000 cGy      SUBJECTIVE:    Currently she has some erythema present in treatment field. Has Miaderm for skin care.       OBJECTIVE:    WEIGHT: 154 lbs 12.8 oz. /66 (BP Location: Left arm, Patient Position: Chair, Cuff Size: Adult Regular)   Pulse 84   Temp 98.1  F (36.7  C) (Tympanic)   Resp 18   Wt 70.2 kg (154 lb 12.8 oz)   SpO2 99%   BMI 26.57 kg/m    Examination reveals Rash in upper inner quadrant and low neck with a papillary component .      IMPRESSION:  Routine tolerance to radiation therapy. Rash as noted.      PLAN:  Continue treatment as planned . Continue miaderm, can use hydrocortisone for pruritus        Covering locum provider was present during face to face encounter with patient and has reviewed medical record and imaging.      MARIN Wheatley  MD Merlin

## 2024-09-09 NOTE — TELEPHONE ENCOUNTER
Spoke with Meron that machine is down today and that the pt's appointment would be canceled today.     Eliu SANCHEZ(NANDINI.)  Department of Radiation Oncology  Phone: [phone]

## 2024-09-09 NOTE — PROGRESS NOTES
Infusion Nursing Note:  Angella Ley presents today for jt bae.    Patient seen by provider today: No   present during visit today: Not Applicable.    Note: Patient reports no changes from last week.       Intravenous Access:  Peripheral IV placed.    Treatment Conditions:  Lab Results   Component Value Date    HGB 16.0 (H) 09/04/2024    WBC 11.0 09/04/2024    ANEUTAUTO 10.0 (H) 03/01/2024     09/04/2024        Lab Results   Component Value Date     03/01/2024    POTASSIUM 4.8 03/01/2024    CR 0.75 09/04/2024    ALONA 8.9 03/01/2024    BILITOTAL 0.3 03/01/2024    ALBUMIN 3.3 (L) 03/01/2024    ALT 12 09/04/2024    AST 19 03/01/2024       Results reviewed, labs MET treatment parameters, ok to proceed with treatment.      Post Infusion Assessment:  Patient tolerated infusion without incident.  Blood return noted pre and post infusion.  Site patent and intact, free from redness, edema or discomfort.  No evidence of extravasations.  Access discontinued per protocol.       Discharge Plan:   Copy of AVS reviewed with patient and/or family.  Patient will return 56 days for next appointment.  Patient discharged in stable condition accompanied by: self.  Departure Mode: Ambulatory.      Bonnie Carranza RN

## 2024-09-09 NOTE — PROGRESS NOTES
~~~ NOTE: If the patient answers yes to any of the questions below, hold the infusion and contact ordering provider or on-call provider.    Do you currently have any signs of illness or infection or are you on any antibiotics? No  Have you recently had an elevated temperature, fever, chills, productive cough, coughing for 3 weeks or longer or hemoptysis, abnormal vital signs, night sweats, chest pain or have you noticed a decrease in your appetite, unexplained weight loss or fatigue? No  Have you had any new, sudden, or worsening abdominal pain? No  Do you have any open wounds or new incisions? (exclude for patients with hidradenitis suppurativa) No  Have you recently been diagnosed with any new nervous system diseases (ie. Multiple sclerosis, Guillain Dailey, seizures, neurological changes) or cancer diagnosis? Are you on any form of radiation or chemotherapy? Yes, Verbal okay from Dr. Vazquez for patient to receive simponi aria while on radiation and armidex   Have there been any other new onset medical symptoms? No  Are you pregnant or breast feeding or do you have plans of pregnancy in the future? No; N/A  Do you have any upcoming hospitalizations or surgeries? Does not include esophagogastroduodenoscopy, colonoscopy, endoscopic retrograde cholangiopancreatography (ERCP), endoscopic ultrasound (EUS), dental procedures (including cleanings, fillings, implants, extractions)  or joint aspiration/steroid injections No  Have you or anyone in your household received a live vaccination in the past 4 weeks? Please note: No live vaccines while on biologic/chemotherapy until 6 months after the last treatment. Patient can receive the flu vaccine (shot only).  It is optimal for the patient to get it mid cycle, but it can be given at any time as long as it is not on the day of the infusion. No  If applicable to prescribed medication, confirm negative PPD or quantiferon gold MTB. If positive, verify has negative chest x-ray or  the patient is at least 4 weeks post initiation of INH/B6 therapy and have clearance from provider before infusion (Y/N:[id number])  If applicable to prescribed medication, confirm negative hepatitis B surface antigen or hepatitis C. If positive, clearance from provider before infusion. (Y/N: [id number])  Rheumatology patients receiving tocilizumab (Actemra): If labs were drawn within the past week, hold dosing until cleared to infuse If AST/ALT > 2 X upper limit normal; ANC < 1.0. NO; N/A  Patients receiving belimumab (Benlysta): Have you been having any signs of worsening depression or suicidal ideations? No; N/A

## 2024-09-10 ENCOUNTER — OFFICE VISIT (OUTPATIENT)
Dept: RADIATION ONCOLOGY | Facility: HOSPITAL | Age: 74
End: 2024-09-10
Payer: MEDICARE

## 2024-09-10 ENCOUNTER — RESULTS ONLY (OUTPATIENT)
Dept: RADIATION ONCOLOGY | Facility: HOSPITAL | Age: 74
End: 2024-09-10

## 2024-09-10 VITALS
BODY MASS INDEX: 26.57 KG/M2 | SYSTOLIC BLOOD PRESSURE: 102 MMHG | OXYGEN SATURATION: 99 % | RESPIRATION RATE: 18 BRPM | TEMPERATURE: 98.1 F | DIASTOLIC BLOOD PRESSURE: 66 MMHG | WEIGHT: 154.8 LBS | HEART RATE: 84 BPM

## 2024-09-10 DIAGNOSIS — C50.912 BREAST CANCER, STAGE 2, LEFT (H): Primary | ICD-10-CM

## 2024-09-10 LAB
RAD ONC ARIA COURSE ID: NORMAL
RAD ONC ARIA COURSE LAST TREATMENT DATE: NORMAL
RAD ONC ARIA COURSE START DATE: NORMAL
RAD ONC ARIA COURSE TREATMENT ELAPSED DAYS: 20
RAD ONC ARIA FIRST TREATMENT DATE: NORMAL
RAD ONC ARIA PLAN FRACTIONS TREATED TO DATE: 13
RAD ONC ARIA PLAN FRACTIONS TREATED TO DATE: 7
RAD ONC ARIA PLAN ID: NORMAL
RAD ONC ARIA PLAN ID: NORMAL
RAD ONC ARIA PLAN PRESCRIBED DOSE PER FRACTION: 2 GY
RAD ONC ARIA PLAN PRESCRIBED DOSE PER FRACTION: 2 GY
RAD ONC ARIA PLAN TOTAL FRACTIONS PRESCRIBED: 13
RAD ONC ARIA PLAN TOTAL FRACTIONS PRESCRIBED: 25
RAD ONC ARIA PLAN TOTAL PRESCRIBED DOSE: 2600 CGY
RAD ONC ARIA PLAN TOTAL PRESCRIBED DOSE: 5000 CGY
RAD ONC ARIA REFERENCE POINT DOSAGE GIVEN TO DATE: NORMAL GY
RAD ONC ARIA REFERENCE POINT ID: NORMAL

## 2024-09-10 ASSESSMENT — PAIN SCALES - GENERAL: PAINLEVEL: NO PAIN (0)

## 2024-09-11 ENCOUNTER — RESULTS ONLY (OUTPATIENT)
Dept: RADIATION ONCOLOGY | Facility: HOSPITAL | Age: 74
End: 2024-09-11

## 2024-09-11 ENCOUNTER — APPOINTMENT (OUTPATIENT)
Dept: RADIATION ONCOLOGY | Facility: HOSPITAL | Age: 74
End: 2024-09-11
Payer: COMMERCIAL

## 2024-09-11 LAB
RAD ONC ARIA COURSE ID: NORMAL
RAD ONC ARIA COURSE LAST TREATMENT DATE: NORMAL
RAD ONC ARIA COURSE START DATE: NORMAL
RAD ONC ARIA COURSE TREATMENT ELAPSED DAYS: 21
RAD ONC ARIA FIRST TREATMENT DATE: NORMAL
RAD ONC ARIA PLAN FRACTIONS TREATED TO DATE: 14
RAD ONC ARIA PLAN FRACTIONS TREATED TO DATE: 7
RAD ONC ARIA PLAN ID: NORMAL
RAD ONC ARIA PLAN ID: NORMAL
RAD ONC ARIA PLAN PRESCRIBED DOSE PER FRACTION: 2 GY
RAD ONC ARIA PLAN PRESCRIBED DOSE PER FRACTION: 2 GY
RAD ONC ARIA PLAN TOTAL FRACTIONS PRESCRIBED: 12
RAD ONC ARIA PLAN TOTAL FRACTIONS PRESCRIBED: 25
RAD ONC ARIA PLAN TOTAL PRESCRIBED DOSE: 2400 CGY
RAD ONC ARIA PLAN TOTAL PRESCRIBED DOSE: 5000 CGY
RAD ONC ARIA REFERENCE POINT DOSAGE GIVEN TO DATE: NORMAL GY
RAD ONC ARIA REFERENCE POINT ID: NORMAL

## 2024-09-11 PROCEDURE — 77387 GUIDANCE FOR RADJ TX DLVR: CPT | Performed by: RADIOLOGY

## 2024-09-11 PROCEDURE — 77412 RADIATION TX DELIVERY LVL 3: CPT | Performed by: RADIOLOGY

## 2024-09-11 PROCEDURE — G6002 STEREOSCOPIC X-RAY GUIDANCE: HCPCS | Mod: 26 | Performed by: RADIOLOGY

## 2024-09-12 ENCOUNTER — RESULTS ONLY (OUTPATIENT)
Dept: RADIATION ONCOLOGY | Facility: HOSPITAL | Age: 74
End: 2024-09-12

## 2024-09-12 ENCOUNTER — APPOINTMENT (OUTPATIENT)
Dept: RADIATION ONCOLOGY | Facility: HOSPITAL | Age: 74
End: 2024-09-12
Payer: COMMERCIAL

## 2024-09-12 LAB
RAD ONC ARIA COURSE ID: NORMAL
RAD ONC ARIA COURSE LAST TREATMENT DATE: NORMAL
RAD ONC ARIA COURSE START DATE: NORMAL
RAD ONC ARIA COURSE TREATMENT ELAPSED DAYS: 22
RAD ONC ARIA FIRST TREATMENT DATE: NORMAL
RAD ONC ARIA PLAN FRACTIONS TREATED TO DATE: 15
RAD ONC ARIA PLAN FRACTIONS TREATED TO DATE: 8
RAD ONC ARIA PLAN ID: NORMAL
RAD ONC ARIA PLAN ID: NORMAL
RAD ONC ARIA PLAN PRESCRIBED DOSE PER FRACTION: 2 GY
RAD ONC ARIA PLAN PRESCRIBED DOSE PER FRACTION: 2 GY
RAD ONC ARIA PLAN TOTAL FRACTIONS PRESCRIBED: 13
RAD ONC ARIA PLAN TOTAL FRACTIONS PRESCRIBED: 25
RAD ONC ARIA PLAN TOTAL PRESCRIBED DOSE: 2600 CGY
RAD ONC ARIA PLAN TOTAL PRESCRIBED DOSE: 5000 CGY
RAD ONC ARIA REFERENCE POINT DOSAGE GIVEN TO DATE: NORMAL GY
RAD ONC ARIA REFERENCE POINT ID: NORMAL

## 2024-09-12 PROCEDURE — 77427 RADIATION TX MANAGEMENT X5: CPT | Performed by: RADIOLOGY

## 2024-09-12 PROCEDURE — 77336 RADIATION PHYSICS CONSULT: CPT | Performed by: RADIOLOGY

## 2024-09-12 PROCEDURE — G6002 STEREOSCOPIC X-RAY GUIDANCE: HCPCS | Mod: 26 | Performed by: RADIOLOGY

## 2024-09-12 PROCEDURE — 77412 RADIATION TX DELIVERY LVL 3: CPT | Performed by: RADIOLOGY

## 2024-09-12 PROCEDURE — 77387 GUIDANCE FOR RADJ TX DLVR: CPT | Performed by: RADIOLOGY

## 2024-09-13 ENCOUNTER — APPOINTMENT (OUTPATIENT)
Dept: RADIATION ONCOLOGY | Facility: HOSPITAL | Age: 74
End: 2024-09-13
Payer: COMMERCIAL

## 2024-09-13 ENCOUNTER — RESULTS ONLY (OUTPATIENT)
Dept: RADIATION ONCOLOGY | Facility: HOSPITAL | Age: 74
End: 2024-09-13

## 2024-09-13 ENCOUNTER — THERAPY VISIT (OUTPATIENT)
Dept: PHYSICAL THERAPY | Facility: HOSPITAL | Age: 74
End: 2024-09-13
Payer: MEDICARE

## 2024-09-13 DIAGNOSIS — C50.912 BREAST CANCER, STAGE 2, LEFT (H): ICD-10-CM

## 2024-09-13 LAB
RAD ONC ARIA COURSE ID: NORMAL
RAD ONC ARIA COURSE LAST TREATMENT DATE: NORMAL
RAD ONC ARIA COURSE START DATE: NORMAL
RAD ONC ARIA COURSE TREATMENT ELAPSED DAYS: 23
RAD ONC ARIA FIRST TREATMENT DATE: NORMAL
RAD ONC ARIA PLAN FRACTIONS TREATED TO DATE: 16
RAD ONC ARIA PLAN FRACTIONS TREATED TO DATE: 8
RAD ONC ARIA PLAN ID: NORMAL
RAD ONC ARIA PLAN ID: NORMAL
RAD ONC ARIA PLAN PRESCRIBED DOSE PER FRACTION: 2 GY
RAD ONC ARIA PLAN PRESCRIBED DOSE PER FRACTION: 2 GY
RAD ONC ARIA PLAN TOTAL FRACTIONS PRESCRIBED: 12
RAD ONC ARIA PLAN TOTAL FRACTIONS PRESCRIBED: 25
RAD ONC ARIA PLAN TOTAL PRESCRIBED DOSE: 2400 CGY
RAD ONC ARIA PLAN TOTAL PRESCRIBED DOSE: 5000 CGY
RAD ONC ARIA REFERENCE POINT DOSAGE GIVEN TO DATE: NORMAL GY
RAD ONC ARIA REFERENCE POINT ID: NORMAL

## 2024-09-13 PROCEDURE — 999N000104 HC STATISTIC NO CHARGE

## 2024-09-13 PROCEDURE — 97530 THERAPEUTIC ACTIVITIES: CPT | Mod: GP

## 2024-09-13 PROCEDURE — 77412 RADIATION TX DELIVERY LVL 3: CPT | Performed by: RADIOLOGY

## 2024-09-13 PROCEDURE — 77387 GUIDANCE FOR RADJ TX DLVR: CPT | Performed by: RADIOLOGY

## 2024-09-13 PROCEDURE — 97162 PT EVAL MOD COMPLEX 30 MIN: CPT | Mod: GP

## 2024-09-13 PROCEDURE — G6002 STEREOSCOPIC X-RAY GUIDANCE: HCPCS | Mod: 26 | Performed by: RADIOLOGY

## 2024-09-16 ENCOUNTER — APPOINTMENT (OUTPATIENT)
Dept: RADIATION ONCOLOGY | Facility: HOSPITAL | Age: 74
End: 2024-09-16
Payer: COMMERCIAL

## 2024-09-16 ENCOUNTER — RESULTS ONLY (OUTPATIENT)
Dept: RADIATION ONCOLOGY | Facility: HOSPITAL | Age: 74
End: 2024-09-16

## 2024-09-16 LAB
RAD ONC ARIA COURSE ID: NORMAL
RAD ONC ARIA COURSE LAST TREATMENT DATE: NORMAL
RAD ONC ARIA COURSE START DATE: NORMAL
RAD ONC ARIA COURSE TREATMENT ELAPSED DAYS: 26
RAD ONC ARIA FIRST TREATMENT DATE: NORMAL
RAD ONC ARIA PLAN FRACTIONS TREATED TO DATE: 17
RAD ONC ARIA PLAN FRACTIONS TREATED TO DATE: 9
RAD ONC ARIA PLAN ID: NORMAL
RAD ONC ARIA PLAN ID: NORMAL
RAD ONC ARIA PLAN PRESCRIBED DOSE PER FRACTION: 2 GY
RAD ONC ARIA PLAN PRESCRIBED DOSE PER FRACTION: 2 GY
RAD ONC ARIA PLAN TOTAL FRACTIONS PRESCRIBED: 13
RAD ONC ARIA PLAN TOTAL FRACTIONS PRESCRIBED: 25
RAD ONC ARIA PLAN TOTAL PRESCRIBED DOSE: 2600 CGY
RAD ONC ARIA PLAN TOTAL PRESCRIBED DOSE: 5000 CGY
RAD ONC ARIA REFERENCE POINT DOSAGE GIVEN TO DATE: NORMAL GY
RAD ONC ARIA REFERENCE POINT ID: NORMAL

## 2024-09-16 PROCEDURE — 77412 RADIATION TX DELIVERY LVL 3: CPT | Performed by: RADIOLOGY

## 2024-09-16 PROCEDURE — 77387 GUIDANCE FOR RADJ TX DLVR: CPT | Performed by: RADIOLOGY

## 2024-09-16 PROCEDURE — G6002 STEREOSCOPIC X-RAY GUIDANCE: HCPCS | Mod: 26 | Performed by: RADIOLOGY

## 2024-09-17 ENCOUNTER — RESULTS ONLY (OUTPATIENT)
Dept: RADIATION ONCOLOGY | Facility: HOSPITAL | Age: 74
End: 2024-09-17

## 2024-09-17 ENCOUNTER — OFFICE VISIT (OUTPATIENT)
Dept: RADIATION ONCOLOGY | Facility: HOSPITAL | Age: 74
End: 2024-09-17
Payer: MEDICARE

## 2024-09-17 VITALS
RESPIRATION RATE: 20 BRPM | BODY MASS INDEX: 27 KG/M2 | HEART RATE: 94 BPM | SYSTOLIC BLOOD PRESSURE: 126 MMHG | WEIGHT: 157.3 LBS | DIASTOLIC BLOOD PRESSURE: 78 MMHG | OXYGEN SATURATION: 99 % | TEMPERATURE: 98.6 F

## 2024-09-17 DIAGNOSIS — C50.912 BREAST CANCER, STAGE 2, LEFT (H): Primary | ICD-10-CM

## 2024-09-17 LAB
RAD ONC ARIA COURSE ID: NORMAL
RAD ONC ARIA COURSE LAST TREATMENT DATE: NORMAL
RAD ONC ARIA COURSE START DATE: NORMAL
RAD ONC ARIA COURSE TREATMENT ELAPSED DAYS: 27
RAD ONC ARIA FIRST TREATMENT DATE: NORMAL
RAD ONC ARIA PLAN FRACTIONS TREATED TO DATE: 18
RAD ONC ARIA PLAN FRACTIONS TREATED TO DATE: 9
RAD ONC ARIA PLAN ID: NORMAL
RAD ONC ARIA PLAN ID: NORMAL
RAD ONC ARIA PLAN PRESCRIBED DOSE PER FRACTION: 2 GY
RAD ONC ARIA PLAN PRESCRIBED DOSE PER FRACTION: 2 GY
RAD ONC ARIA PLAN TOTAL FRACTIONS PRESCRIBED: 12
RAD ONC ARIA PLAN TOTAL FRACTIONS PRESCRIBED: 25
RAD ONC ARIA PLAN TOTAL PRESCRIBED DOSE: 2400 CGY
RAD ONC ARIA PLAN TOTAL PRESCRIBED DOSE: 5000 CGY
RAD ONC ARIA REFERENCE POINT DOSAGE GIVEN TO DATE: NORMAL GY
RAD ONC ARIA REFERENCE POINT ID: NORMAL

## 2024-09-17 ASSESSMENT — PAIN SCALES - GENERAL: PAINLEVEL: NO PAIN (0)

## 2024-09-17 NOTE — PROGRESS NOTES
Progress Notes  Encounter Date: Sep 17, 2024  RADIATION ONCOLOGY WEEKLY MANAGEMENT PROGRESS NOTE     Patient Care Team         Relationship Specialty Notifications Start End    Kenton Pena PCP - General   5/20/13     Phone: [phone] Fax: [phone]         [address] [place] [address]    Sally Adrian APRN CNP Nurse Practitioner Radiation Oncology  7/30/24     Phone: [phone] Fax: [phone]         [address] [place] [address]    Friday, Norris BUTT MD  Hematology & Oncology  7/30/24     Phone: [phone] Fax: [phone]         [address] [facility] [address]    Sally Wolfe MD MD Surgery  7/30/24     Phone: [phone] Fax: [phone]         [address] [place] [address]    Bobby Lai MD Assigned Cancer Care Provider   8/23/24     Phone: [phone] Fax: [phone]         [address] [place] [address]              DIAGNOSIS:  Cancer Staging   No matching staging information was found for the patient.        RADIATION THERAPY:    Angella Ley has received 3600 cGy to date to the left chest wall and regional nodes..   Treatment 18/25  Total planned dose: 5000 cGy      SUBJECTIVE:    Currently she has grade 1 erythema present in treatment field and rashy changes in upper inner quadrant.. Has Miaderm for skin care. Some fatigue noted. Using oral Benedryl for itching.      OBJECTIVE:    WEIGHT: 0 lbs 0 oz. There were no vitals taken for this visit.  Examination reveals Rash in upper inner quadrant and low neck with a papillary component .      IMPRESSION:  Routine tolerance to radiation therapy. Rash as noted.      PLAN:  Continue treatment as planned . Continue miaderm, can use hydrocortisone for pruritus or topical Benadryl.       Covering locum provider was present during face to face encounter with patient and has reviewed medical record and imaging.      Bobby Lai MD

## 2024-09-18 ENCOUNTER — APPOINTMENT (OUTPATIENT)
Dept: RADIATION ONCOLOGY | Facility: HOSPITAL | Age: 74
End: 2024-09-18
Payer: COMMERCIAL

## 2024-09-18 ENCOUNTER — RESULTS ONLY (OUTPATIENT)
Dept: RADIATION ONCOLOGY | Facility: HOSPITAL | Age: 74
End: 2024-09-18

## 2024-09-18 LAB
RAD ONC ARIA COURSE ID: NORMAL
RAD ONC ARIA COURSE LAST TREATMENT DATE: NORMAL
RAD ONC ARIA COURSE START DATE: NORMAL
RAD ONC ARIA COURSE TREATMENT ELAPSED DAYS: 28
RAD ONC ARIA FIRST TREATMENT DATE: NORMAL
RAD ONC ARIA PLAN FRACTIONS TREATED TO DATE: 10
RAD ONC ARIA PLAN FRACTIONS TREATED TO DATE: 19
RAD ONC ARIA PLAN ID: NORMAL
RAD ONC ARIA PLAN ID: NORMAL
RAD ONC ARIA PLAN PRESCRIBED DOSE PER FRACTION: 2 GY
RAD ONC ARIA PLAN PRESCRIBED DOSE PER FRACTION: 2 GY
RAD ONC ARIA PLAN TOTAL FRACTIONS PRESCRIBED: 13
RAD ONC ARIA PLAN TOTAL FRACTIONS PRESCRIBED: 25
RAD ONC ARIA PLAN TOTAL PRESCRIBED DOSE: 2600 CGY
RAD ONC ARIA PLAN TOTAL PRESCRIBED DOSE: 5000 CGY
RAD ONC ARIA REFERENCE POINT DOSAGE GIVEN TO DATE: NORMAL GY
RAD ONC ARIA REFERENCE POINT ID: NORMAL

## 2024-09-18 PROCEDURE — G6002 STEREOSCOPIC X-RAY GUIDANCE: HCPCS | Mod: 26 | Performed by: RADIOLOGY

## 2024-09-18 PROCEDURE — 77412 RADIATION TX DELIVERY LVL 3: CPT | Performed by: RADIOLOGY

## 2024-09-18 PROCEDURE — 77387 GUIDANCE FOR RADJ TX DLVR: CPT | Performed by: RADIOLOGY

## 2024-09-18 NOTE — PROGRESS NOTES
PHYSICAL THERAPY EVALUATION  Type of Visit: Evaluation        Fall Risk Screen:  Fall screen completed by: PT  Have you fallen 2 or more times in the past year?: Yes  Have you fallen and had an injury in the past year?: Yes  Is patient a fall risk?: Yes    Subjective       Presenting condition or subjective complaint: breast cancer  Date of onset: 24    Relevant medical history:     Dates & types of surgery: left breast masectomy may 2024 both knees    Prior diagnostic imaging/testing results: MRI; CT scan; X-ray; Bone scan     Prior therapy history for the same diagnosis, illness or injury:        Prior Level of Function  Transfers: Independent  Ambulation: Independent  ADL: Independent  IADL: Driving, Finances, Housekeeping, Laundry, Meal preparation, Medication management, Yard work    Living Environment  Social support: With a caregiver/helper   Type of home: House   Stairs to enter the home:         Ramp: No   Stairs inside the home: No       Help at home: Self Cares (home health aide/personal care attendant, family, etc); Home management tasks (cooking, cleaning); Medication and/or finances; Home and Yard maintenance tasks; Assist for driving and community activities  Equipment owned: Straight Cane; Four-point cane; Walker; Walker with wheels; Grab bars; Raised toilet seat; Lift chair     Employment: No    Hobbies/Interests:      Patient goals for therapy:      Pain assessment: Pain present  Location: L breast pain/Ratin/10     Objective      Cognitive Status Examination  Orientation: Oriented to person, place and time   Level of Consciousness: Anxious, Confused  Follows Commands and Answers Questions: 75% of the time  Personal Safety and Judgement: Impulsive  Memory: Impaired, Patient's niece present today to help with some memory/cognition concerns    OBSERVATION: Patient very pleasant and able to walk back to the treatment room indep  INTEGUMENTARY: Impaired  POSTURE: Standing Posture: Rounded  shoulders, Forward head, Thoracic kyphosis increased  Sitting Posture: Rounded shoulders, Forward head, Thoracic kyphosis increased  PALPATION: Tenderness to palpation noted to the L breast wall   RANGE OF MOTION:   (Degrees) Left AROM Left PROM Right AROM  Right PROM   Shoulder Flexion 165 180 177 180   Shoulder Extension 12 14 14 18   Shoulder Abduction 144 149 155 160   Shoulder Adduction WFL WFL WFL WFL   Shoulder Internal Rotation 26 31 33 35   Shoulder External Rotation 36 41 40 43   Shoulder Horizontal Abduction Not assessed  Not assessed  Not assessed  Not assessed    Shoulder Horizontal Adduction Not assessed  Not assessed  Not assessed  Not assessed    Shoulder Flexion ER 33 35 36 41   Shoulder Flexion IR 27 31 35 40   Elbow Extension WFL WFL WFL WFL   Elbow Flexion WFL WFL WFL WFL   Pain:   End feel:   STRENGTH: LE Strength WFL  Pain: - none + mild ++ moderate +++ severe  Strength Scale: 0-5/5 Left Right   Shoulder Flexion 5- 5   Shoulder Extension 4 4+   Shoulder Abduction 4 5   Shoulder Adduction 4 4   Shoulder Internal Rotation 4 4   Shoulder External Rotation 4 4   Shoulder Horizontal Abduction 4 4   Shoulder Horizontal Adduction 4 4   Elbow Flexion 5 5   Elbow Extension 5 5   Mid Trap 4 4   Lower Trap 4 4   Rhomboid 4 4   Serratus Anterior 4 4       BED MOBILITY: WFL    TRANSFERS: WFL    GAIT:   Level of [place]: WFL  Assistive Device(s): None  Gait Deviations: WFL    BALANCE: Sitting Balance (static):Normal  Sitting Balance (dynamic):Good  Sit to Stand Balance:Fair  Standing Balance (static):Fair  Standing Balance (dynamic):Poor    Assessment & Plan   CLINICAL IMPRESSIONS  Medical Diagnosis: Breast cancer, stage 2, left (H)    Treatment Diagnosis: Breast cancer, stage 2, left (H)   Impression/Assessment: Patient is a 74 year old female with L breast pain complaints.  The following significant findings have been identified: Pain, Decreased ROM/flexibility, Decreased joint mobility, Decreased  strength, Inflammation, Impaired muscle performance, Decreased activity tolerance, and Impaired posture. These impairments interfere with their ability to perform self care tasks, recreational activities, household chores, household mobility, and community mobility as compared to previous level of function.     Clinical Decision Making (Complexity):  Clinical Presentation: Evolving/Changing  Clinical Presentation Rationale: based on medical and personal factors listed in PT evaluation  Clinical Decision Making (Complexity): Moderate complexity    PLAN OF CARE  Treatment Interventions:  Modalities: Cryotherapy, Dry Needling, E-stim, Ultrasound  Interventions: Gait Training, Manual Therapy, Neuromuscular Re-education, Therapeutic Activity, Therapeutic Exercise    Long Term Goals     PT Goal 1  Goal Identifier: STG-1  Goal Description: Patient will be able to complete dressing indep without a LOB  Rationale: to maximize safety and independence with performance of ADLs and functional tasks  Goal Progress: New  Target Date: 10/11/24  PT Goal 2  Goal Identifier: STG-2  Goal Description: Patient will posess full shoulder ROM in standing to allow her to wash her hair indep  Rationale: to maximize safety and independence with performance of ADLs and functional tasks  Goal Progress: New  Target Date: 10/25/24  PT Goal 3  Goal Identifier: LTG-1  Goal Description: Patient will possess a custom HEP to allow self-management of symptoms at home  Rationale: to maximize safety and independence with performance of ADLs and functional tasks  Goal Progress: New  Target Date: 12/06/24      Frequency of Treatment: 1x/week  Duration of Treatment: 12 weeks    Education Assessment:        Risks and benefits of evaluation/treatment have been explained.   Patient/Family/caregiver agrees with Plan of Care.     Evaluation Time:     PT Eval, Moderate Complexity Minutes (77483): 36     Signing Clinician: Alfie Amaya, PT        [facility]  [facility]                                                                                   OUTPATIENT PHYSICAL THERAPY      PLAN OF TREATMENT FOR OUTPATIENT REHABILITATION   Patient's Last Name, First Name, Angella Voss YOB: 1950   Provider's Name   [facility]   Medical Record No.  [id number]     Onset Date: 09/13/24  Start of Care Date: 09/13/24     Medical Diagnosis:  Breast cancer, stage 2, left (H)      PT Treatment Diagnosis:  Breast cancer, stage 2, left (H) Plan of Treatment  Frequency/Duration: 1x/week/ 12 weeks    Certification date from 09/13/24 to 12/06/24         See note for plan of treatment details and functional goals     Alfie Amaya, PT                         I CERTIFY THE NEED FOR THESE SERVICES FURNISHED UNDER        THIS PLAN OF TREATMENT AND WHILE UNDER MY CARE     (Physician attestation of this document indicates review and certification of the therapy plan).              Referring Provider:  Sally Adrian    Initial Assessment  See Epic Evaluation- Start of Care Date: 09/13/24

## 2024-09-19 ENCOUNTER — RESULTS ONLY (OUTPATIENT)
Dept: RADIATION ONCOLOGY | Facility: HOSPITAL | Age: 74
End: 2024-09-19

## 2024-09-19 ENCOUNTER — APPOINTMENT (OUTPATIENT)
Dept: RADIATION ONCOLOGY | Facility: HOSPITAL | Age: 74
End: 2024-09-19
Payer: COMMERCIAL

## 2024-09-19 LAB
RAD ONC ARIA COURSE ID: NORMAL
RAD ONC ARIA COURSE LAST TREATMENT DATE: NORMAL
RAD ONC ARIA COURSE START DATE: NORMAL
RAD ONC ARIA COURSE TREATMENT ELAPSED DAYS: 29
RAD ONC ARIA FIRST TREATMENT DATE: NORMAL
RAD ONC ARIA PLAN FRACTIONS TREATED TO DATE: 10
RAD ONC ARIA PLAN FRACTIONS TREATED TO DATE: 20
RAD ONC ARIA PLAN ID: NORMAL
RAD ONC ARIA PLAN ID: NORMAL
RAD ONC ARIA PLAN PRESCRIBED DOSE PER FRACTION: 2 GY
RAD ONC ARIA PLAN PRESCRIBED DOSE PER FRACTION: 2 GY
RAD ONC ARIA PLAN TOTAL FRACTIONS PRESCRIBED: 12
RAD ONC ARIA PLAN TOTAL FRACTIONS PRESCRIBED: 25
RAD ONC ARIA PLAN TOTAL PRESCRIBED DOSE: 2400 CGY
RAD ONC ARIA PLAN TOTAL PRESCRIBED DOSE: 5000 CGY
RAD ONC ARIA REFERENCE POINT DOSAGE GIVEN TO DATE: NORMAL GY
RAD ONC ARIA REFERENCE POINT ID: NORMAL

## 2024-09-19 PROCEDURE — 77387 GUIDANCE FOR RADJ TX DLVR: CPT | Performed by: RADIOLOGY

## 2024-09-19 PROCEDURE — 77336 RADIATION PHYSICS CONSULT: CPT | Performed by: RADIOLOGY

## 2024-09-19 PROCEDURE — G6002 STEREOSCOPIC X-RAY GUIDANCE: HCPCS | Mod: 26 | Performed by: RADIOLOGY

## 2024-09-19 PROCEDURE — 77412 RADIATION TX DELIVERY LVL 3: CPT | Performed by: RADIOLOGY

## 2024-09-19 PROCEDURE — 77427 RADIATION TX MANAGEMENT X5: CPT | Performed by: RADIOLOGY

## 2024-09-20 ENCOUNTER — THERAPY VISIT (OUTPATIENT)
Dept: PHYSICAL THERAPY | Facility: HOSPITAL | Age: 74
End: 2024-09-20
Payer: MEDICARE

## 2024-09-20 ENCOUNTER — APPOINTMENT (OUTPATIENT)
Dept: RADIATION ONCOLOGY | Facility: HOSPITAL | Age: 74
End: 2024-09-20
Payer: COMMERCIAL

## 2024-09-20 ENCOUNTER — RESULTS ONLY (OUTPATIENT)
Dept: RADIATION ONCOLOGY | Facility: HOSPITAL | Age: 74
End: 2024-09-20

## 2024-09-20 DIAGNOSIS — C50.912 INVASIVE DUCTAL CARCINOMA OF BREAST, FEMALE, LEFT (H): Primary | ICD-10-CM

## 2024-09-20 LAB
RAD ONC ARIA COURSE ID: NORMAL
RAD ONC ARIA COURSE LAST TREATMENT DATE: NORMAL
RAD ONC ARIA COURSE START DATE: NORMAL
RAD ONC ARIA COURSE TREATMENT ELAPSED DAYS: 30
RAD ONC ARIA FIRST TREATMENT DATE: NORMAL
RAD ONC ARIA PLAN FRACTIONS TREATED TO DATE: 11
RAD ONC ARIA PLAN FRACTIONS TREATED TO DATE: 21
RAD ONC ARIA PLAN ID: NORMAL
RAD ONC ARIA PLAN ID: NORMAL
RAD ONC ARIA PLAN PRESCRIBED DOSE PER FRACTION: 2 GY
RAD ONC ARIA PLAN PRESCRIBED DOSE PER FRACTION: 2 GY
RAD ONC ARIA PLAN TOTAL FRACTIONS PRESCRIBED: 13
RAD ONC ARIA PLAN TOTAL FRACTIONS PRESCRIBED: 25
RAD ONC ARIA PLAN TOTAL PRESCRIBED DOSE: 2600 CGY
RAD ONC ARIA PLAN TOTAL PRESCRIBED DOSE: 5000 CGY
RAD ONC ARIA REFERENCE POINT DOSAGE GIVEN TO DATE: NORMAL GY
RAD ONC ARIA REFERENCE POINT ID: NORMAL

## 2024-09-20 PROCEDURE — 77387 GUIDANCE FOR RADJ TX DLVR: CPT | Performed by: RADIOLOGY

## 2024-09-20 PROCEDURE — G6002 STEREOSCOPIC X-RAY GUIDANCE: HCPCS | Mod: 26 | Performed by: RADIOLOGY

## 2024-09-20 PROCEDURE — 97530 THERAPEUTIC ACTIVITIES: CPT | Mod: GP

## 2024-09-20 PROCEDURE — 77412 RADIATION TX DELIVERY LVL 3: CPT | Performed by: RADIOLOGY

## 2024-09-20 PROCEDURE — 97140 MANUAL THERAPY 1/> REGIONS: CPT | Mod: GP

## 2024-09-23 ENCOUNTER — APPOINTMENT (OUTPATIENT)
Dept: RADIATION ONCOLOGY | Facility: HOSPITAL | Age: 74
End: 2024-09-23
Payer: COMMERCIAL

## 2024-09-23 ENCOUNTER — RESULTS ONLY (OUTPATIENT)
Dept: RADIATION ONCOLOGY | Facility: HOSPITAL | Age: 74
End: 2024-09-23

## 2024-09-23 LAB
RAD ONC ARIA COURSE ID: NORMAL
RAD ONC ARIA COURSE LAST TREATMENT DATE: NORMAL
RAD ONC ARIA COURSE START DATE: NORMAL
RAD ONC ARIA COURSE TREATMENT ELAPSED DAYS: 33
RAD ONC ARIA FIRST TREATMENT DATE: NORMAL
RAD ONC ARIA PLAN FRACTIONS TREATED TO DATE: 11
RAD ONC ARIA PLAN FRACTIONS TREATED TO DATE: 22
RAD ONC ARIA PLAN ID: NORMAL
RAD ONC ARIA PLAN ID: NORMAL
RAD ONC ARIA PLAN PRESCRIBED DOSE PER FRACTION: 2 GY
RAD ONC ARIA PLAN PRESCRIBED DOSE PER FRACTION: 2 GY
RAD ONC ARIA PLAN TOTAL FRACTIONS PRESCRIBED: 12
RAD ONC ARIA PLAN TOTAL FRACTIONS PRESCRIBED: 25
RAD ONC ARIA PLAN TOTAL PRESCRIBED DOSE: 2400 CGY
RAD ONC ARIA PLAN TOTAL PRESCRIBED DOSE: 5000 CGY
RAD ONC ARIA REFERENCE POINT DOSAGE GIVEN TO DATE: NORMAL GY
RAD ONC ARIA REFERENCE POINT ID: NORMAL

## 2024-09-23 PROCEDURE — G6002 STEREOSCOPIC X-RAY GUIDANCE: HCPCS | Mod: 26 | Performed by: RADIOLOGY

## 2024-09-23 PROCEDURE — 77412 RADIATION TX DELIVERY LVL 3: CPT | Performed by: RADIOLOGY

## 2024-09-23 PROCEDURE — 77387 GUIDANCE FOR RADJ TX DLVR: CPT | Performed by: RADIOLOGY

## 2024-09-24 ENCOUNTER — OFFICE VISIT (OUTPATIENT)
Dept: RADIATION ONCOLOGY | Facility: HOSPITAL | Age: 74
End: 2024-09-24

## 2024-09-24 ENCOUNTER — RESULTS ONLY (OUTPATIENT)
Dept: RADIATION ONCOLOGY | Facility: HOSPITAL | Age: 74
End: 2024-09-24

## 2024-09-24 VITALS
SYSTOLIC BLOOD PRESSURE: 132 MMHG | HEART RATE: 91 BPM | WEIGHT: 154.7 LBS | OXYGEN SATURATION: 97 % | BODY MASS INDEX: 26.55 KG/M2 | TEMPERATURE: 98.5 F | DIASTOLIC BLOOD PRESSURE: 82 MMHG | RESPIRATION RATE: 16 BRPM

## 2024-09-24 DIAGNOSIS — C50.912 BREAST CANCER, STAGE 2, LEFT (H): Primary | ICD-10-CM

## 2024-09-24 LAB
RAD ONC ARIA COURSE ID: NORMAL
RAD ONC ARIA COURSE LAST TREATMENT DATE: NORMAL
RAD ONC ARIA COURSE START DATE: NORMAL
RAD ONC ARIA COURSE TREATMENT ELAPSED DAYS: 34
RAD ONC ARIA FIRST TREATMENT DATE: NORMAL
RAD ONC ARIA PLAN FRACTIONS TREATED TO DATE: 12
RAD ONC ARIA PLAN FRACTIONS TREATED TO DATE: 23
RAD ONC ARIA PLAN ID: NORMAL
RAD ONC ARIA PLAN ID: NORMAL
RAD ONC ARIA PLAN PRESCRIBED DOSE PER FRACTION: 2 GY
RAD ONC ARIA PLAN PRESCRIBED DOSE PER FRACTION: 2 GY
RAD ONC ARIA PLAN TOTAL FRACTIONS PRESCRIBED: 13
RAD ONC ARIA PLAN TOTAL FRACTIONS PRESCRIBED: 25
RAD ONC ARIA PLAN TOTAL PRESCRIBED DOSE: 2600 CGY
RAD ONC ARIA PLAN TOTAL PRESCRIBED DOSE: 5000 CGY
RAD ONC ARIA REFERENCE POINT DOSAGE GIVEN TO DATE: NORMAL GY
RAD ONC ARIA REFERENCE POINT ID: NORMAL

## 2024-09-24 ASSESSMENT — PAIN SCALES - GENERAL: PAINLEVEL: NO PAIN (0)

## 2024-09-24 NOTE — PROGRESS NOTES
Progress Notes  Encounter Date: Sep 24, 2024  RADIATION ONCOLOGY WEEKLY MANAGEMENT PROGRESS NOTE     Patient Care Team         Relationship Specialty Notifications Start End    Kenton Pena PCP - General   5/20/13     Phone: [phone] Fax: [phone]         [address] [place] [address]    Sally Adrian APRN CNP Nurse Practitioner Radiation Oncology  7/30/24     Phone: [phone] Fax: [phone]         [address] [place] [address]    Friday, Norris BUTT MD  Hematology & Oncology  7/30/24     Phone: [phone] Fax: [phone]         [address] [facility] [address]    Sally Wolfe MD MD Surgery  7/30/24     Phone: [phone] Fax: [phone]         [address] [place] [address]    Bobby Lai MD Assigned Cancer Care Provider   8/23/24     Phone: [phone] Fax: [phone]         [address] [place] [address]              DIAGNOSIS:  Cancer Staging   No matching staging information was found for the patient.        RADIATION THERAPY:    Angella Ley has received 4600 cGy to date to the left chest wall and regional nodes..   Treatment 23/25  Total planned dose: 5000 cGy      SUBJECTIVE:    Currently she has dense rashy  upper inner quadrant erythema present in treatment field. Has Miaderm for skin care. Fatigue stable.      OBJECTIVE:    WEIGHT: 0 lbs 0 oz. There were no vitals taken for this visit.  Examination reveals Skin findings as above.      IMPRESSION:  Routine tolerance to radiation therapy. Rash as noted.      PLAN:  Continue treatment as planned .        Reviewed medical record and imaging.

## 2024-09-25 ENCOUNTER — RESULTS ONLY (OUTPATIENT)
Dept: RADIATION ONCOLOGY | Facility: HOSPITAL | Age: 74
End: 2024-09-25

## 2024-09-25 ENCOUNTER — APPOINTMENT (OUTPATIENT)
Dept: RADIATION ONCOLOGY | Facility: HOSPITAL | Age: 74
End: 2024-09-25
Payer: COMMERCIAL

## 2024-09-25 LAB
RAD ONC ARIA COURSE ID: NORMAL
RAD ONC ARIA COURSE LAST TREATMENT DATE: NORMAL
RAD ONC ARIA COURSE START DATE: NORMAL
RAD ONC ARIA COURSE TREATMENT ELAPSED DAYS: 35
RAD ONC ARIA FIRST TREATMENT DATE: NORMAL
RAD ONC ARIA PLAN FRACTIONS TREATED TO DATE: 12
RAD ONC ARIA PLAN FRACTIONS TREATED TO DATE: 24
RAD ONC ARIA PLAN ID: NORMAL
RAD ONC ARIA PLAN ID: NORMAL
RAD ONC ARIA PLAN PRESCRIBED DOSE PER FRACTION: 2 GY
RAD ONC ARIA PLAN PRESCRIBED DOSE PER FRACTION: 2 GY
RAD ONC ARIA PLAN TOTAL FRACTIONS PRESCRIBED: 12
RAD ONC ARIA PLAN TOTAL FRACTIONS PRESCRIBED: 25
RAD ONC ARIA PLAN TOTAL PRESCRIBED DOSE: 2400 CGY
RAD ONC ARIA PLAN TOTAL PRESCRIBED DOSE: 5000 CGY
RAD ONC ARIA REFERENCE POINT DOSAGE GIVEN TO DATE: NORMAL GY
RAD ONC ARIA REFERENCE POINT ID: NORMAL

## 2024-09-25 PROCEDURE — 77387 GUIDANCE FOR RADJ TX DLVR: CPT | Performed by: RADIOLOGY

## 2024-09-25 PROCEDURE — G6002 STEREOSCOPIC X-RAY GUIDANCE: HCPCS | Mod: 26 | Performed by: RADIOLOGY

## 2024-09-25 PROCEDURE — 77412 RADIATION TX DELIVERY LVL 3: CPT | Performed by: RADIOLOGY

## 2024-09-26 ENCOUNTER — RESULTS ONLY (OUTPATIENT)
Dept: RADIATION ONCOLOGY | Facility: HOSPITAL | Age: 74
End: 2024-09-26

## 2024-09-26 ENCOUNTER — ALLIED HEALTH/NURSE VISIT (OUTPATIENT)
Dept: RADIATION ONCOLOGY | Facility: HOSPITAL | Age: 74
End: 2024-09-26

## 2024-09-26 DIAGNOSIS — C50.912 BREAST CANCER, STAGE 2, LEFT (H): Primary | ICD-10-CM

## 2024-09-26 LAB
RAD ONC ARIA COURSE ID: NORMAL
RAD ONC ARIA COURSE LAST TREATMENT DATE: NORMAL
RAD ONC ARIA COURSE START DATE: NORMAL
RAD ONC ARIA COURSE TREATMENT ELAPSED DAYS: 36
RAD ONC ARIA FIRST TREATMENT DATE: NORMAL
RAD ONC ARIA PLAN FRACTIONS TREATED TO DATE: 13
RAD ONC ARIA PLAN FRACTIONS TREATED TO DATE: 25
RAD ONC ARIA PLAN ID: NORMAL
RAD ONC ARIA PLAN ID: NORMAL
RAD ONC ARIA PLAN PRESCRIBED DOSE PER FRACTION: 2 GY
RAD ONC ARIA PLAN PRESCRIBED DOSE PER FRACTION: 2 GY
RAD ONC ARIA PLAN TOTAL FRACTIONS PRESCRIBED: 13
RAD ONC ARIA PLAN TOTAL FRACTIONS PRESCRIBED: 25
RAD ONC ARIA PLAN TOTAL PRESCRIBED DOSE: 2600 CGY
RAD ONC ARIA PLAN TOTAL PRESCRIBED DOSE: 5000 CGY
RAD ONC ARIA REFERENCE POINT DOSAGE GIVEN TO DATE: NORMAL GY
RAD ONC ARIA REFERENCE POINT ID: NORMAL

## 2024-09-26 NOTE — PROGRESS NOTES
Angella Ley  Gender: female  : 1950  Medical Record: [id number]  Primary Care Provider: Kenton Pena    REFERRING PHYSICIANS: Dr. 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 Grade 2, ER+ IA+ Her2 -. Positive skeletal muscle involvemnet.  TREATMENT INTENT: Curative  AREA TREATED: Left chest wall and regional nodes.  PRIMARY TREATMENT TECHNIQUE: 3 D conformal  ENERGY: 6X chest wall and 10X nodes  TUMOR DOSE: 200  NUMBER OF TREATMENTS: 25    TOTAL NUMBER OF TREATMENTS: 25  TOTAL DOSE: 5000  ELAPSED CALENDAR DAYS: 36  COMPLETION DATE: 2024       Sally Adrian DNP, APRN, FNP-C   Department of Radiation Oncology

## 2024-10-04 ENCOUNTER — THERAPY VISIT (OUTPATIENT)
Dept: PHYSICAL THERAPY | Facility: HOSPITAL | Age: 74
End: 2024-10-04
Payer: MEDICARE

## 2024-10-04 DIAGNOSIS — C50.912 INVASIVE DUCTAL CARCINOMA OF BREAST, FEMALE, LEFT (H): Primary | ICD-10-CM

## 2024-10-04 PROCEDURE — 97140 MANUAL THERAPY 1/> REGIONS: CPT | Mod: GP,CQ

## 2024-11-07 ENCOUNTER — INFUSION THERAPY VISIT (OUTPATIENT)
Dept: INFUSION THERAPY | Facility: OTHER | Age: 74
End: 2024-11-07
Attending: FAMILY MEDICINE
Payer: MEDICARE

## 2024-11-07 VITALS
OXYGEN SATURATION: 98 % | TEMPERATURE: 97.9 F | WEIGHT: 151.01 LBS | DIASTOLIC BLOOD PRESSURE: 82 MMHG | HEART RATE: 78 BPM | SYSTOLIC BLOOD PRESSURE: 132 MMHG | BODY MASS INDEX: 25.92 KG/M2 | RESPIRATION RATE: 16 BRPM

## 2024-11-07 DIAGNOSIS — M05.9 RHEUMATOID ARTHRITIS WITH POSITIVE RHEUMATOID FACTOR, INVOLVING UNSPECIFIED SITE (H): Primary | ICD-10-CM

## 2024-11-07 LAB
ALT SERPL W P-5'-P-CCNC: 11 U/L (ref 0–50)
CREAT SERPL-MCNC: 0.73 MG/DL (ref 0.51–0.95)
CRP SERPL-MCNC: 7.87 MG/L
EGFRCR SERPLBLD CKD-EPI 2021: 85 ML/MIN/1.73M2
ERYTHROCYTE [SEDIMENTATION RATE] IN BLOOD BY WESTERGREN METHOD: 11 MM/HR (ref 0–30)
HGB BLD-MCNC: 16.2 G/DL (ref 11.7–15.7)
PLATELET # BLD AUTO: 319 10E3/UL (ref 150–450)
WBC # BLD AUTO: 10.5 10E3/UL (ref 4–11)

## 2024-11-07 PROCEDURE — 85049 AUTOMATED PLATELET COUNT: CPT | Mod: ZL

## 2024-11-07 PROCEDURE — 96365 THER/PROPH/DIAG IV INF INIT: CPT

## 2024-11-07 PROCEDURE — 85048 AUTOMATED LEUKOCYTE COUNT: CPT | Mod: ZL

## 2024-11-07 PROCEDURE — 84460 ALANINE AMINO (ALT) (SGPT): CPT | Mod: ZL

## 2024-11-07 PROCEDURE — 82565 ASSAY OF CREATININE: CPT | Mod: ZL

## 2024-11-07 PROCEDURE — 250N000011 HC RX IP 250 OP 636: Performed by: FAMILY MEDICINE

## 2024-11-07 PROCEDURE — 85018 HEMOGLOBIN: CPT | Mod: ZL

## 2024-11-07 PROCEDURE — 258N000003 HC RX IP 258 OP 636: Performed by: FAMILY MEDICINE

## 2024-11-07 PROCEDURE — 86140 C-REACTIVE PROTEIN: CPT | Mod: ZL

## 2024-11-07 PROCEDURE — 85652 RBC SED RATE AUTOMATED: CPT | Mod: ZL

## 2024-11-07 PROCEDURE — 36415 COLL VENOUS BLD VENIPUNCTURE: CPT

## 2024-11-07 RX ORDER — ALBUTEROL SULFATE 0.83 MG/ML
2.5 SOLUTION RESPIRATORY (INHALATION)
OUTPATIENT
Start: 2025-01-02

## 2024-11-07 RX ORDER — HEPARIN SODIUM (PORCINE) LOCK FLUSH IV SOLN 100 UNIT/ML 100 UNIT/ML
5 SOLUTION INTRAVENOUS
OUTPATIENT
Start: 2025-01-02

## 2024-11-07 RX ORDER — HEPARIN SODIUM,PORCINE 10 UNIT/ML
5-20 VIAL (ML) INTRAVENOUS DAILY PRN
OUTPATIENT
Start: 2025-01-02

## 2024-11-07 RX ORDER — EPINEPHRINE 1 MG/ML
0.3 INJECTION, SOLUTION, CONCENTRATE INTRAVENOUS EVERY 5 MIN PRN
OUTPATIENT
Start: 2025-01-02

## 2024-11-07 RX ORDER — METHYLPREDNISOLONE SODIUM SUCCINATE 125 MG/2ML
125 INJECTION INTRAMUSCULAR; INTRAVENOUS
Start: 2025-01-02

## 2024-11-07 RX ORDER — DIPHENHYDRAMINE HYDROCHLORIDE 50 MG/ML
50 INJECTION INTRAMUSCULAR; INTRAVENOUS
Start: 2025-01-02

## 2024-11-07 RX ORDER — ALBUTEROL SULFATE 90 UG/1
1-2 INHALANT RESPIRATORY (INHALATION)
Start: 2025-01-02

## 2024-11-07 RX ORDER — MEPERIDINE HYDROCHLORIDE 25 MG/ML
25 INJECTION INTRAMUSCULAR; INTRAVENOUS; SUBCUTANEOUS EVERY 30 MIN PRN
OUTPATIENT
Start: 2025-01-02

## 2024-11-07 RX ADMIN — GOLIMUMAB 137 MG: 50 SOLUTION INTRAVENOUS at 12:26

## 2024-11-07 RX ADMIN — SODIUM CHLORIDE 250 ML: 9 INJECTION, SOLUTION INTRAVENOUS at 12:26

## 2024-11-07 NOTE — PROGRESS NOTES
Infusion Nursing Note:  Angella ZELAYA Josuéranjit presents today for Mathew Diaz.    Patient seen by provider today: No   present during visit today: Not Applicable.    Note: N/A.    Intravenous Access:  Peripheral IV placed.  Labs drawn without difficulty from peripheral IV.    Treatment Conditions:  Results reviewed, labs MET treatment parameters, ok to proceed with treatment.    Component      Latest Ref Rng 11/7/2024  11:21 AM   Creatinine      0.51 - 0.95 mg/dL 0.73    GFR Estimate      >60 mL/min/1.73m2 85    Platelet Count      150 - 450 10e3/uL 319    ALT      0 - 50 U/L 11    Sed Rate      0 - 30 mm/hr 11    CRP Inflammation      <5.00 mg/L 7.87 (H)    WBC      4.0 - 11.0 10e3/uL 10.5    Hemoglobin      11.7 - 15.7 g/dL 16.2 (H)       Legend:  (H) High    Post Infusion Assessment:  Patient tolerated infusion without incident.  Site patent and intact, free from redness, edema or discomfort.  No evidence of extravasations.  Access discontinued per protocol.     Discharge Plan:   Patient discharged in stable condition accompanied by: granddaughter.  Departure Mode: Ambulatory.

## 2025-01-29 ENCOUNTER — INFUSION THERAPY VISIT (OUTPATIENT)
Dept: INFUSION THERAPY | Facility: OTHER | Age: 75
End: 2025-01-29
Attending: FAMILY MEDICINE
Payer: MEDICARE

## 2025-01-29 VITALS
WEIGHT: 152.78 LBS | HEART RATE: 84 BPM | BODY MASS INDEX: 26.22 KG/M2 | OXYGEN SATURATION: 98 % | SYSTOLIC BLOOD PRESSURE: 142 MMHG | TEMPERATURE: 97.4 F | RESPIRATION RATE: 16 BRPM | DIASTOLIC BLOOD PRESSURE: 80 MMHG

## 2025-01-29 DIAGNOSIS — M05.9 RHEUMATOID ARTHRITIS WITH POSITIVE RHEUMATOID FACTOR, INVOLVING UNSPECIFIED SITE (H): Primary | ICD-10-CM

## 2025-01-29 LAB
ALT SERPL W P-5'-P-CCNC: 14 U/L (ref 0–50)
CREAT SERPL-MCNC: 0.76 MG/DL (ref 0.51–0.95)
CRP SERPL-MCNC: 52.53 MG/L
EGFRCR SERPLBLD CKD-EPI 2021: 81 ML/MIN/1.73M2
ERYTHROCYTE [SEDIMENTATION RATE] IN BLOOD BY WESTERGREN METHOD: 16 MM/HR (ref 0–30)
HGB BLD-MCNC: 16.1 G/DL (ref 11.7–15.7)
PLATELET # BLD AUTO: 378 10E3/UL (ref 150–450)
WBC # BLD AUTO: 12.1 10E3/UL (ref 4–11)

## 2025-01-29 PROCEDURE — 85048 AUTOMATED LEUKOCYTE COUNT: CPT | Mod: ZL

## 2025-01-29 PROCEDURE — 85018 HEMOGLOBIN: CPT | Mod: ZL

## 2025-01-29 PROCEDURE — 84460 ALANINE AMINO (ALT) (SGPT): CPT | Mod: ZL

## 2025-01-29 PROCEDURE — 85049 AUTOMATED PLATELET COUNT: CPT | Mod: ZL

## 2025-01-29 PROCEDURE — 250N000011 HC RX IP 250 OP 636: Performed by: FAMILY MEDICINE

## 2025-01-29 PROCEDURE — 258N000003 HC RX IP 258 OP 636: Performed by: FAMILY MEDICINE

## 2025-01-29 PROCEDURE — 85652 RBC SED RATE AUTOMATED: CPT | Mod: ZL

## 2025-01-29 PROCEDURE — 82565 ASSAY OF CREATININE: CPT | Mod: ZL

## 2025-01-29 PROCEDURE — 86140 C-REACTIVE PROTEIN: CPT | Mod: ZL

## 2025-01-29 PROCEDURE — 36415 COLL VENOUS BLD VENIPUNCTURE: CPT

## 2025-01-29 RX ORDER — ALBUTEROL SULFATE 0.83 MG/ML
2.5 SOLUTION RESPIRATORY (INHALATION)
OUTPATIENT
Start: 2025-03-19

## 2025-01-29 RX ORDER — MEPERIDINE HYDROCHLORIDE 25 MG/ML
25 INJECTION INTRAMUSCULAR; INTRAVENOUS; SUBCUTANEOUS EVERY 30 MIN PRN
OUTPATIENT
Start: 2025-03-19

## 2025-01-29 RX ORDER — METHYLPREDNISOLONE SODIUM SUCCINATE 125 MG/2ML
125 INJECTION INTRAMUSCULAR; INTRAVENOUS
Start: 2025-03-19

## 2025-01-29 RX ORDER — HEPARIN SODIUM (PORCINE) LOCK FLUSH IV SOLN 100 UNIT/ML 100 UNIT/ML
5 SOLUTION INTRAVENOUS
OUTPATIENT
Start: 2025-03-19

## 2025-01-29 RX ORDER — HEPARIN SODIUM,PORCINE 10 UNIT/ML
5-20 VIAL (ML) INTRAVENOUS DAILY PRN
OUTPATIENT
Start: 2025-03-19

## 2025-01-29 RX ORDER — DIPHENHYDRAMINE HYDROCHLORIDE 50 MG/ML
50 INJECTION INTRAMUSCULAR; INTRAVENOUS
Start: 2025-03-19

## 2025-01-29 RX ORDER — EPINEPHRINE 1 MG/ML
0.3 INJECTION, SOLUTION, CONCENTRATE INTRAVENOUS EVERY 5 MIN PRN
OUTPATIENT
Start: 2025-03-19

## 2025-01-29 RX ORDER — CETIRIZINE HYDROCHLORIDE 10 MG/1
10 TABLET ORAL PRN
COMMUNITY

## 2025-01-29 RX ORDER — ALBUTEROL SULFATE 90 UG/1
1-2 INHALANT RESPIRATORY (INHALATION)
Start: 2025-03-19

## 2025-01-29 RX ADMIN — SODIUM CHLORIDE 250 ML: 9 INJECTION, SOLUTION INTRAVENOUS at 13:57

## 2025-01-29 RX ADMIN — GOLIMUMAB 139 MG: 50 SOLUTION INTRAVENOUS at 13:59

## 2025-01-29 NOTE — PROGRESS NOTES
Infusion Nursing Note:  Angella NATHALIE Ley presents today for Mathew Diaz.    Patient seen by provider today: No   present during visit today: Not Applicable.    Note: N/A.    Intravenous Access:  Peripheral IV placed,unable to draw labs from PIV.    Labs drawn without difficulty with butterfly from Right AC.    Treatment Conditions:  Results reviewed, labs MET treatment parameters, ok to proceed with treatment.    Component      Latest Ref Rng 1/29/2025  1:01 PM   Creatinine      0.51 - 0.95 mg/dL 0.76    GFR Estimate      >60 mL/min/1.73m2 81    Hemoglobin      11.7 - 15.7 g/dL 16.1 (H)    WBC      4.0 - 11.0 10e3/uL 12.1 (H)    CRP Inflammation      <5.00 mg/L 52.53 (H)    Sed Rate      0 - 30 mm/hr 16    ALT      0 - 50 U/L 14    Platelet Count      150 - 450 10e3/uL 378       Legend:  (H) High    Post Infusion Assessment:  Patient tolerated infusion without incident.  Site patent and intact, free from redness, edema or discomfort.  No evidence of extravasations.  Access discontinued per protocol.     Discharge Plan:   Patient discharged in stable condition accompanied by: family.  Departure Mode: Ambulatory.

## 2025-03-26 ENCOUNTER — INFUSION THERAPY VISIT (OUTPATIENT)
Dept: INFUSION THERAPY | Facility: OTHER | Age: 75
End: 2025-03-26
Attending: FAMILY MEDICINE
Payer: MEDICARE

## 2025-03-26 VITALS
HEART RATE: 91 BPM | HEIGHT: 64 IN | TEMPERATURE: 98.4 F | BODY MASS INDEX: 25.48 KG/M2 | DIASTOLIC BLOOD PRESSURE: 73 MMHG | SYSTOLIC BLOOD PRESSURE: 118 MMHG | OXYGEN SATURATION: 98 % | WEIGHT: 149.25 LBS

## 2025-03-26 DIAGNOSIS — M05.9 RHEUMATOID ARTHRITIS WITH POSITIVE RHEUMATOID FACTOR, INVOLVING UNSPECIFIED SITE (H): Primary | ICD-10-CM

## 2025-03-26 LAB
ALT SERPL W P-5'-P-CCNC: 12 U/L (ref 0–50)
CREAT SERPL-MCNC: 0.8 MG/DL (ref 0.51–0.95)
CRP SERPL-MCNC: 3.11 MG/L
EGFRCR SERPLBLD CKD-EPI 2021: 76 ML/MIN/1.73M2
ERYTHROCYTE [SEDIMENTATION RATE] IN BLOOD BY WESTERGREN METHOD: 9 MM/HR (ref 0–30)
HGB BLD-MCNC: 16.2 G/DL (ref 11.7–15.7)
PLATELET # BLD AUTO: 334 10E3/UL (ref 150–450)
WBC # BLD AUTO: 12.8 10E3/UL (ref 4–11)

## 2025-03-26 PROCEDURE — 84460 ALANINE AMINO (ALT) (SGPT): CPT | Mod: ZL

## 2025-03-26 PROCEDURE — 82565 ASSAY OF CREATININE: CPT | Mod: ZL

## 2025-03-26 PROCEDURE — 36415 COLL VENOUS BLD VENIPUNCTURE: CPT

## 2025-03-26 PROCEDURE — 85652 RBC SED RATE AUTOMATED: CPT | Mod: ZL

## 2025-03-26 PROCEDURE — 85048 AUTOMATED LEUKOCYTE COUNT: CPT | Mod: ZL

## 2025-03-26 PROCEDURE — 86140 C-REACTIVE PROTEIN: CPT | Mod: ZL

## 2025-03-26 PROCEDURE — 250N000011 HC RX IP 250 OP 636: Mod: JZ | Performed by: FAMILY MEDICINE

## 2025-03-26 PROCEDURE — 85049 AUTOMATED PLATELET COUNT: CPT | Mod: ZL

## 2025-03-26 PROCEDURE — 85018 HEMOGLOBIN: CPT | Mod: ZL

## 2025-03-26 RX ORDER — MEPERIDINE HYDROCHLORIDE 25 MG/ML
25 INJECTION INTRAMUSCULAR; INTRAVENOUS; SUBCUTANEOUS EVERY 30 MIN PRN
OUTPATIENT
Start: 2025-05-14

## 2025-03-26 RX ORDER — HEPARIN SODIUM,PORCINE 10 UNIT/ML
5-20 VIAL (ML) INTRAVENOUS DAILY PRN
OUTPATIENT
Start: 2025-05-14

## 2025-03-26 RX ORDER — ALBUTEROL SULFATE 90 UG/1
1-2 INHALANT RESPIRATORY (INHALATION)
Start: 2025-05-14

## 2025-03-26 RX ORDER — DIPHENHYDRAMINE HYDROCHLORIDE 50 MG/ML
50 INJECTION, SOLUTION INTRAMUSCULAR; INTRAVENOUS
Start: 2025-05-14

## 2025-03-26 RX ORDER — METHYLPREDNISOLONE SODIUM SUCCINATE 125 MG/2ML
125 INJECTION INTRAMUSCULAR; INTRAVENOUS
Start: 2025-05-14

## 2025-03-26 RX ORDER — ALBUTEROL SULFATE 0.83 MG/ML
2.5 SOLUTION RESPIRATORY (INHALATION)
OUTPATIENT
Start: 2025-05-14

## 2025-03-26 RX ORDER — HEPARIN SODIUM (PORCINE) LOCK FLUSH IV SOLN 100 UNIT/ML 100 UNIT/ML
5 SOLUTION INTRAVENOUS
OUTPATIENT
Start: 2025-05-14

## 2025-03-26 RX ORDER — EPINEPHRINE 1 MG/ML
0.3 INJECTION, SOLUTION, CONCENTRATE INTRAVENOUS EVERY 5 MIN PRN
OUTPATIENT
Start: 2025-05-14

## 2025-03-26 RX ADMIN — GOLIMUMAB 135 MG: 50 SOLUTION INTRAVENOUS at 13:26

## 2025-03-26 RX ADMIN — Medication 250 ML: at 13:24

## 2025-03-26 ASSESSMENT — PAIN SCALES - GENERAL: PAINLEVEL_OUTOF10: NO PAIN (0)

## 2025-03-26 NOTE — PROGRESS NOTES
Infusion Nursing Note:  Angella ZELAYA Josuéranjit presents today for Mathew Diaz.    Patient seen by provider today: No   present during visit today: Not Applicable.    Note: N/A.      Intravenous Access:  Labs drawn without difficulty.  Peripheral IV placed per Lorraine OLIVER.    Treatment Conditions:  Component      Latest Ref Rng 3/26/2025  11:13 AM   Creatinine      0.51 - 0.95 mg/dL 0.80    GFR Estimate      >60 mL/min/1.73m2 76    Hemoglobin      11.7 - 15.7 g/dL 16.2 (H)    WBC      4.0 - 11.0 10e3/uL 12.8 (H)    CRP Inflammation      <5.00 mg/L 3.11    Sed Rate      0 - 30 mm/hr 9    ALT      0 - 50 U/L 12    Platelet Count      150 - 450 10e3/uL 334       Legend:  (H) High    Results reviewed, labs did NOT meet treatment parameters: WBC. Call to [facility] with approval to proceed per Dr Reid      Post Infusion Assessment:  Patient tolerated infusion without incident.  Site patent and intact, free from redness, edema or discomfort.  No evidence of extravasations.  Access discontinued per protocol.       Discharge Plan:   Patient discharged in stable condition accompanied by: great niece.  Departure Mode: Ambulatory.

## 2025-07-17 ENCOUNTER — TELEPHONE (OUTPATIENT)
Dept: INFUSION THERAPY | Facility: OTHER | Age: 75
End: 2025-07-17

## 2025-07-17 DIAGNOSIS — M05.9 RHEUMATOID ARTHRITIS WITH POSITIVE RHEUMATOID FACTOR, INVOLVING UNSPECIFIED SITE (H): Primary | ICD-10-CM

## 2025-07-17 DIAGNOSIS — M06.9 CHRONIC RHEUMATIC ARTHRITIS (H): ICD-10-CM

## 2025-07-17 NOTE — PROGRESS NOTES
Secondary review of simponi plan complete. Therapy plan printed and faxed to [facility] for Dr Vazquez to sign   EOAE (evoked otoacoustic emission)

## 2025-07-17 NOTE — PROGRESS NOTES
Signed orders received for patient to receive Simponi Aria 2mg/kg every 8 weeks.  Labs prior to each infusion: (placed in open orders)  WBC, HGB, PLT, Creatinine, ALT, ESR, CRP    Diagnosis:  M06.9    Therapy plan placed.    Orders placed for secondary review.

## 2025-07-18 NOTE — PROGRESS NOTES
[place] Simponi Aria therapy plan signed by  Dr Vazquez.  Therapy plan routed to Dr Laws to co-sign.

## 2025-08-06 ENCOUNTER — INFUSION THERAPY VISIT (OUTPATIENT)
Dept: INFUSION THERAPY | Facility: OTHER | Age: 75
End: 2025-08-06
Attending: INTERNAL MEDICINE
Payer: MEDICARE

## 2025-08-06 VITALS
DIASTOLIC BLOOD PRESSURE: 64 MMHG | HEART RATE: 76 BPM | WEIGHT: 139.7 LBS | RESPIRATION RATE: 16 BRPM | SYSTOLIC BLOOD PRESSURE: 131 MMHG | TEMPERATURE: 98.6 F | BODY MASS INDEX: 23.98 KG/M2 | OXYGEN SATURATION: 94 %

## 2025-08-06 DIAGNOSIS — M05.9 RHEUMATOID ARTHRITIS WITH POSITIVE RHEUMATOID FACTOR, INVOLVING UNSPECIFIED SITE (H): ICD-10-CM

## 2025-08-06 DIAGNOSIS — M06.9 RHEUMATOID ARTHRITIS, INVOLVING UNSPECIFIED SITE, UNSPECIFIED WHETHER RHEUMATOID FACTOR PRESENT (H): Primary | ICD-10-CM

## 2025-08-06 DIAGNOSIS — M06.9 CHRONIC RHEUMATIC ARTHRITIS (H): ICD-10-CM

## 2025-08-06 LAB
ALT SERPL W P-5'-P-CCNC: 14 U/L (ref 0–50)
CREAT SERPL-MCNC: 0.65 MG/DL (ref 0.51–0.95)
CRP SERPL-MCNC: 21.09 MG/L
EGFRCR SERPLBLD CKD-EPI 2021: >90 ML/MIN/1.73M2
ERYTHROCYTE [SEDIMENTATION RATE] IN BLOOD BY WESTERGREN METHOD: 15 MM/HR (ref 0–30)
HGB BLD-MCNC: 15.6 G/DL (ref 11.7–15.7)
MCV RBC AUTO: 90 FL (ref 78–100)
PLATELET # BLD AUTO: 348 10E3/UL (ref 150–450)
WBC # BLD AUTO: 15.6 10E3/UL (ref 4–11)

## 2025-08-06 PROCEDURE — 85018 HEMOGLOBIN: CPT | Mod: ZL

## 2025-08-06 PROCEDURE — 82565 ASSAY OF CREATININE: CPT | Mod: ZL

## 2025-08-06 PROCEDURE — 85048 AUTOMATED LEUKOCYTE COUNT: CPT | Mod: ZL

## 2025-08-06 PROCEDURE — 86140 C-REACTIVE PROTEIN: CPT | Mod: ZL

## 2025-08-06 PROCEDURE — 250N000011 HC RX IP 250 OP 636: Mod: JW | Performed by: FAMILY MEDICINE

## 2025-08-06 PROCEDURE — 85652 RBC SED RATE AUTOMATED: CPT | Mod: ZL

## 2025-08-06 PROCEDURE — 84460 ALANINE AMINO (ALT) (SGPT): CPT | Mod: ZL

## 2025-08-06 PROCEDURE — 85049 AUTOMATED PLATELET COUNT: CPT | Mod: ZL

## 2025-08-06 PROCEDURE — 36415 COLL VENOUS BLD VENIPUNCTURE: CPT

## 2025-08-06 RX ORDER — ALBUTEROL SULFATE 0.83 MG/ML
2.5 SOLUTION RESPIRATORY (INHALATION)
OUTPATIENT
Start: 2025-10-01

## 2025-08-06 RX ORDER — DIPHENHYDRAMINE HYDROCHLORIDE 50 MG/ML
50 INJECTION, SOLUTION INTRAMUSCULAR; INTRAVENOUS
Start: 2025-10-01

## 2025-08-06 RX ORDER — ALBUTEROL SULFATE 90 UG/1
1-2 INHALANT RESPIRATORY (INHALATION)
Start: 2025-10-01

## 2025-08-06 RX ORDER — MEPERIDINE HYDROCHLORIDE 25 MG/ML
25 INJECTION INTRAMUSCULAR; INTRAVENOUS; SUBCUTANEOUS
OUTPATIENT
Start: 2025-10-01

## 2025-08-06 RX ORDER — DIPHENHYDRAMINE HYDROCHLORIDE 50 MG/ML
25 INJECTION, SOLUTION INTRAMUSCULAR; INTRAVENOUS
Start: 2025-10-01

## 2025-08-06 RX ORDER — HEPARIN SODIUM,PORCINE 10 UNIT/ML
5-20 VIAL (ML) INTRAVENOUS DAILY PRN
OUTPATIENT
Start: 2025-10-01

## 2025-08-06 RX ORDER — HEPARIN SODIUM (PORCINE) LOCK FLUSH IV SOLN 100 UNIT/ML 100 UNIT/ML
5 SOLUTION INTRAVENOUS
OUTPATIENT
Start: 2025-10-01

## 2025-08-06 RX ORDER — METHYLPREDNISOLONE SODIUM SUCCINATE 40 MG/ML
40 INJECTION INTRAMUSCULAR; INTRAVENOUS
Start: 2025-10-01

## 2025-08-06 RX ORDER — EPINEPHRINE 1 MG/ML
0.3 INJECTION, SOLUTION, CONCENTRATE INTRAVENOUS EVERY 5 MIN PRN
OUTPATIENT
Start: 2025-10-01

## 2025-08-06 RX ADMIN — GOLIMUMAB 127 MG: 50 SOLUTION INTRAVENOUS at 13:43

## 2025-08-06 RX ADMIN — Medication 250 ML: at 13:43

## 2025-08-07 ENCOUNTER — MEDICAL CORRESPONDENCE (OUTPATIENT)
Dept: HEALTH INFORMATION MANAGEMENT | Facility: HOSPITAL | Age: 75
End: 2025-08-07

## 2025-08-07 DIAGNOSIS — Z03.89 OBSERVATION FOR SUSPECTED MALIGNANT NEOPLASM: Primary | ICD-10-CM

## 2025-08-07 DIAGNOSIS — M06.9 RA (RHEUMATOID ARTHRITIS) (H): ICD-10-CM
